# Patient Record
Sex: FEMALE | Race: WHITE | NOT HISPANIC OR LATINO | Employment: UNEMPLOYED | URBAN - METROPOLITAN AREA
[De-identification: names, ages, dates, MRNs, and addresses within clinical notes are randomized per-mention and may not be internally consistent; named-entity substitution may affect disease eponyms.]

---

## 2017-01-12 ENCOUNTER — TRANSCRIBE ORDERS (OUTPATIENT)
Dept: ADMINISTRATIVE | Facility: HOSPITAL | Age: 42
End: 2017-01-12

## 2017-01-12 ENCOUNTER — HOSPITAL ENCOUNTER (OUTPATIENT)
Dept: RADIOLOGY | Facility: HOSPITAL | Age: 42
Discharge: HOME | End: 2017-01-12
Payer: COMMERCIAL

## 2017-01-12 DIAGNOSIS — R52 PAIN: Primary | ICD-10-CM

## 2017-01-12 DIAGNOSIS — R52 PAIN: ICD-10-CM

## 2017-01-12 PROCEDURE — 73502 X-RAY EXAM HIP UNI 2-3 VIEWS: CPT

## 2017-04-04 ENCOUNTER — ALLSCRIPTS OFFICE VISIT (OUTPATIENT)
Dept: OTHER | Facility: OTHER | Age: 42
End: 2017-04-04

## 2017-04-04 DIAGNOSIS — M25.552 PAIN IN LEFT HIP: ICD-10-CM

## 2017-04-05 ENCOUNTER — GENERIC CONVERSION - ENCOUNTER (OUTPATIENT)
Dept: OTHER | Facility: OTHER | Age: 42
End: 2017-04-05

## 2017-04-05 ENCOUNTER — APPOINTMENT (OUTPATIENT)
Dept: PHYSICAL THERAPY | Facility: CLINIC | Age: 42
End: 2017-04-05
Payer: COMMERCIAL

## 2017-04-05 DIAGNOSIS — M25.552 PAIN IN LEFT HIP: ICD-10-CM

## 2017-04-05 PROCEDURE — 97140 MANUAL THERAPY 1/> REGIONS: CPT

## 2017-04-05 PROCEDURE — 97161 PT EVAL LOW COMPLEX 20 MIN: CPT

## 2017-04-11 ENCOUNTER — APPOINTMENT (OUTPATIENT)
Dept: PHYSICAL THERAPY | Facility: CLINIC | Age: 42
End: 2017-04-11
Payer: COMMERCIAL

## 2017-04-11 PROCEDURE — 97140 MANUAL THERAPY 1/> REGIONS: CPT

## 2017-04-11 PROCEDURE — 97110 THERAPEUTIC EXERCISES: CPT

## 2017-04-11 PROCEDURE — 97010 HOT OR COLD PACKS THERAPY: CPT

## 2017-04-18 ENCOUNTER — APPOINTMENT (OUTPATIENT)
Dept: PHYSICAL THERAPY | Facility: CLINIC | Age: 42
End: 2017-04-18
Payer: COMMERCIAL

## 2017-04-18 PROCEDURE — 97110 THERAPEUTIC EXERCISES: CPT

## 2017-04-18 PROCEDURE — 97140 MANUAL THERAPY 1/> REGIONS: CPT

## 2017-04-20 ENCOUNTER — APPOINTMENT (OUTPATIENT)
Dept: PHYSICAL THERAPY | Facility: CLINIC | Age: 42
End: 2017-04-20
Payer: COMMERCIAL

## 2017-04-20 PROCEDURE — 97140 MANUAL THERAPY 1/> REGIONS: CPT

## 2017-04-20 PROCEDURE — 97110 THERAPEUTIC EXERCISES: CPT

## 2017-04-25 ENCOUNTER — APPOINTMENT (OUTPATIENT)
Dept: PHYSICAL THERAPY | Facility: CLINIC | Age: 42
End: 2017-04-25
Payer: COMMERCIAL

## 2017-04-25 PROCEDURE — 97110 THERAPEUTIC EXERCISES: CPT

## 2017-04-25 PROCEDURE — 97140 MANUAL THERAPY 1/> REGIONS: CPT

## 2017-04-27 ENCOUNTER — APPOINTMENT (OUTPATIENT)
Dept: PHYSICAL THERAPY | Facility: CLINIC | Age: 42
End: 2017-04-27
Payer: COMMERCIAL

## 2017-04-27 PROCEDURE — 97014 ELECTRIC STIMULATION THERAPY: CPT

## 2017-04-27 PROCEDURE — G0283 ELEC STIM OTHER THAN WOUND: HCPCS

## 2017-04-27 PROCEDURE — 97140 MANUAL THERAPY 1/> REGIONS: CPT

## 2017-04-27 PROCEDURE — 97010 HOT OR COLD PACKS THERAPY: CPT

## 2017-04-27 PROCEDURE — 97110 THERAPEUTIC EXERCISES: CPT

## 2017-05-02 ENCOUNTER — APPOINTMENT (OUTPATIENT)
Dept: PHYSICAL THERAPY | Facility: CLINIC | Age: 42
End: 2017-05-02
Payer: COMMERCIAL

## 2017-05-02 PROCEDURE — 97140 MANUAL THERAPY 1/> REGIONS: CPT

## 2017-05-02 PROCEDURE — 97110 THERAPEUTIC EXERCISES: CPT

## 2017-05-04 ENCOUNTER — APPOINTMENT (OUTPATIENT)
Dept: PHYSICAL THERAPY | Facility: CLINIC | Age: 42
End: 2017-05-04
Payer: COMMERCIAL

## 2017-05-04 PROCEDURE — 97110 THERAPEUTIC EXERCISES: CPT

## 2017-05-04 PROCEDURE — 97140 MANUAL THERAPY 1/> REGIONS: CPT

## 2017-05-09 ENCOUNTER — APPOINTMENT (OUTPATIENT)
Dept: PHYSICAL THERAPY | Facility: CLINIC | Age: 42
End: 2017-05-09
Payer: COMMERCIAL

## 2017-05-09 PROCEDURE — 97140 MANUAL THERAPY 1/> REGIONS: CPT

## 2017-05-09 PROCEDURE — 97110 THERAPEUTIC EXERCISES: CPT

## 2017-05-11 ENCOUNTER — APPOINTMENT (OUTPATIENT)
Dept: PHYSICAL THERAPY | Facility: CLINIC | Age: 42
End: 2017-05-11
Payer: COMMERCIAL

## 2017-05-11 ENCOUNTER — GENERIC CONVERSION - ENCOUNTER (OUTPATIENT)
Dept: OTHER | Facility: OTHER | Age: 42
End: 2017-05-11

## 2017-05-11 PROCEDURE — 97110 THERAPEUTIC EXERCISES: CPT

## 2017-05-11 PROCEDURE — 97140 MANUAL THERAPY 1/> REGIONS: CPT

## 2017-05-16 ENCOUNTER — TRANSCRIBE ORDERS (OUTPATIENT)
Dept: ADMINISTRATIVE | Facility: HOSPITAL | Age: 42
End: 2017-05-16

## 2017-05-16 ENCOUNTER — ALLSCRIPTS OFFICE VISIT (OUTPATIENT)
Dept: OTHER | Facility: OTHER | Age: 42
End: 2017-05-16

## 2017-05-16 DIAGNOSIS — M25.552 LEFT HIP PAIN: Primary | ICD-10-CM

## 2017-05-24 ENCOUNTER — HOSPITAL ENCOUNTER (OUTPATIENT)
Dept: RADIOLOGY | Facility: HOSPITAL | Age: 42
Discharge: HOME | End: 2017-05-24
Attending: ORTHOPAEDIC SURGERY
Payer: COMMERCIAL

## 2017-05-24 ENCOUNTER — HOSPITAL ENCOUNTER (OUTPATIENT)
Dept: MRI IMAGING | Facility: HOSPITAL | Age: 42
Discharge: HOME | End: 2017-05-24
Attending: ORTHOPAEDIC SURGERY
Payer: COMMERCIAL

## 2017-05-24 DIAGNOSIS — M25.552 LEFT HIP PAIN: ICD-10-CM

## 2017-05-24 PROCEDURE — 73722 MRI JOINT OF LWR EXTR W/DYE: CPT

## 2017-05-24 PROCEDURE — A9585 GADOBUTROL INJECTION: HCPCS | Performed by: ORTHOPAEDIC SURGERY

## 2017-05-24 PROCEDURE — 77002 NEEDLE LOCALIZATION BY XRAY: CPT

## 2017-05-24 PROCEDURE — 27095 INJECTION FOR HIP X-RAY: CPT

## 2017-05-24 RX ADMIN — IOHEXOL 50 ML: 300 INJECTION, SOLUTION INTRAVENOUS at 13:11

## 2017-05-24 RX ADMIN — GADOBUTROL 0.2 ML: 604.72 INJECTION INTRAVENOUS at 13:12

## 2017-05-26 ENCOUNTER — ALLSCRIPTS OFFICE VISIT (OUTPATIENT)
Dept: OTHER | Facility: OTHER | Age: 42
End: 2017-05-26

## 2017-05-30 ENCOUNTER — GENERIC CONVERSION - ENCOUNTER (OUTPATIENT)
Dept: OTHER | Facility: OTHER | Age: 42
End: 2017-05-30

## 2017-08-14 ENCOUNTER — APPOINTMENT (OUTPATIENT)
Dept: PHYSICAL THERAPY | Facility: CLINIC | Age: 42
End: 2017-08-14
Payer: COMMERCIAL

## 2017-08-14 PROCEDURE — 97140 MANUAL THERAPY 1/> REGIONS: CPT

## 2017-08-14 PROCEDURE — 97010 HOT OR COLD PACKS THERAPY: CPT

## 2017-08-14 PROCEDURE — 97162 PT EVAL MOD COMPLEX 30 MIN: CPT

## 2017-08-17 ENCOUNTER — APPOINTMENT (OUTPATIENT)
Dept: PHYSICAL THERAPY | Facility: CLINIC | Age: 42
End: 2017-08-17
Payer: COMMERCIAL

## 2017-08-17 PROCEDURE — 97010 HOT OR COLD PACKS THERAPY: CPT

## 2017-08-17 PROCEDURE — 97140 MANUAL THERAPY 1/> REGIONS: CPT

## 2017-08-17 PROCEDURE — 97110 THERAPEUTIC EXERCISES: CPT

## 2017-08-22 ENCOUNTER — APPOINTMENT (OUTPATIENT)
Dept: PHYSICAL THERAPY | Facility: CLINIC | Age: 42
End: 2017-08-22
Payer: COMMERCIAL

## 2017-08-22 PROCEDURE — 97110 THERAPEUTIC EXERCISES: CPT

## 2017-08-22 PROCEDURE — 97140 MANUAL THERAPY 1/> REGIONS: CPT

## 2017-08-24 ENCOUNTER — APPOINTMENT (OUTPATIENT)
Dept: PHYSICAL THERAPY | Facility: CLINIC | Age: 42
End: 2017-08-24
Payer: COMMERCIAL

## 2017-08-24 PROCEDURE — 97010 HOT OR COLD PACKS THERAPY: CPT

## 2017-08-24 PROCEDURE — 97110 THERAPEUTIC EXERCISES: CPT

## 2017-08-24 PROCEDURE — 97140 MANUAL THERAPY 1/> REGIONS: CPT

## 2017-08-29 ENCOUNTER — APPOINTMENT (OUTPATIENT)
Dept: PHYSICAL THERAPY | Facility: CLINIC | Age: 42
End: 2017-08-29
Payer: COMMERCIAL

## 2017-08-29 PROCEDURE — 97010 HOT OR COLD PACKS THERAPY: CPT

## 2017-08-29 PROCEDURE — 97140 MANUAL THERAPY 1/> REGIONS: CPT

## 2017-08-29 PROCEDURE — 97110 THERAPEUTIC EXERCISES: CPT

## 2017-08-31 ENCOUNTER — APPOINTMENT (OUTPATIENT)
Dept: PHYSICAL THERAPY | Facility: CLINIC | Age: 42
End: 2017-08-31
Payer: COMMERCIAL

## 2017-08-31 PROCEDURE — 97140 MANUAL THERAPY 1/> REGIONS: CPT

## 2017-08-31 PROCEDURE — 97110 THERAPEUTIC EXERCISES: CPT

## 2017-08-31 PROCEDURE — 97010 HOT OR COLD PACKS THERAPY: CPT

## 2017-09-05 ENCOUNTER — APPOINTMENT (OUTPATIENT)
Dept: PHYSICAL THERAPY | Facility: CLINIC | Age: 42
End: 2017-09-05
Payer: COMMERCIAL

## 2017-09-05 PROCEDURE — 97110 THERAPEUTIC EXERCISES: CPT

## 2017-09-05 PROCEDURE — 97140 MANUAL THERAPY 1/> REGIONS: CPT

## 2017-09-07 ENCOUNTER — APPOINTMENT (OUTPATIENT)
Dept: PHYSICAL THERAPY | Facility: CLINIC | Age: 42
End: 2017-09-07
Payer: COMMERCIAL

## 2017-09-07 PROCEDURE — 97110 THERAPEUTIC EXERCISES: CPT

## 2017-09-07 PROCEDURE — 97140 MANUAL THERAPY 1/> REGIONS: CPT

## 2017-09-07 PROCEDURE — 97010 HOT OR COLD PACKS THERAPY: CPT

## 2017-09-12 ENCOUNTER — APPOINTMENT (OUTPATIENT)
Dept: PHYSICAL THERAPY | Facility: CLINIC | Age: 42
End: 2017-09-12
Payer: COMMERCIAL

## 2017-09-12 PROCEDURE — 97110 THERAPEUTIC EXERCISES: CPT

## 2017-09-12 PROCEDURE — 97010 HOT OR COLD PACKS THERAPY: CPT

## 2017-09-12 PROCEDURE — 97140 MANUAL THERAPY 1/> REGIONS: CPT

## 2017-09-14 ENCOUNTER — APPOINTMENT (OUTPATIENT)
Dept: PHYSICAL THERAPY | Facility: CLINIC | Age: 42
End: 2017-09-14
Payer: COMMERCIAL

## 2017-09-19 ENCOUNTER — APPOINTMENT (OUTPATIENT)
Dept: PHYSICAL THERAPY | Facility: CLINIC | Age: 42
End: 2017-09-19
Payer: COMMERCIAL

## 2017-09-19 PROCEDURE — 97010 HOT OR COLD PACKS THERAPY: CPT

## 2017-09-19 PROCEDURE — 97140 MANUAL THERAPY 1/> REGIONS: CPT

## 2017-09-19 PROCEDURE — 97110 THERAPEUTIC EXERCISES: CPT

## 2017-09-21 ENCOUNTER — APPOINTMENT (OUTPATIENT)
Dept: PHYSICAL THERAPY | Facility: CLINIC | Age: 42
End: 2017-09-21
Payer: COMMERCIAL

## 2017-09-21 PROCEDURE — 97110 THERAPEUTIC EXERCISES: CPT

## 2017-09-21 PROCEDURE — 97010 HOT OR COLD PACKS THERAPY: CPT

## 2017-09-21 PROCEDURE — 97140 MANUAL THERAPY 1/> REGIONS: CPT

## 2017-09-26 ENCOUNTER — APPOINTMENT (OUTPATIENT)
Dept: PHYSICAL THERAPY | Facility: CLINIC | Age: 42
End: 2017-09-26
Payer: COMMERCIAL

## 2017-09-26 PROCEDURE — 97010 HOT OR COLD PACKS THERAPY: CPT

## 2017-09-26 PROCEDURE — 97140 MANUAL THERAPY 1/> REGIONS: CPT

## 2017-09-26 PROCEDURE — 97110 THERAPEUTIC EXERCISES: CPT

## 2017-09-28 ENCOUNTER — APPOINTMENT (OUTPATIENT)
Dept: PHYSICAL THERAPY | Facility: CLINIC | Age: 42
End: 2017-09-28
Payer: COMMERCIAL

## 2017-09-28 PROCEDURE — 97110 THERAPEUTIC EXERCISES: CPT

## 2017-09-28 PROCEDURE — 97140 MANUAL THERAPY 1/> REGIONS: CPT

## 2017-10-03 ENCOUNTER — APPOINTMENT (OUTPATIENT)
Dept: PHYSICAL THERAPY | Facility: CLINIC | Age: 42
End: 2017-10-03
Payer: COMMERCIAL

## 2017-10-03 PROCEDURE — 97140 MANUAL THERAPY 1/> REGIONS: CPT

## 2017-10-03 PROCEDURE — 97110 THERAPEUTIC EXERCISES: CPT

## 2017-10-03 PROCEDURE — 97010 HOT OR COLD PACKS THERAPY: CPT

## 2017-10-06 ENCOUNTER — HOSPITAL ENCOUNTER (OUTPATIENT)
Dept: RADIOLOGY | Facility: HOSPITAL | Age: 42
Discharge: HOME | End: 2017-10-06
Payer: COMMERCIAL

## 2017-10-06 ENCOUNTER — TRANSCRIBE ORDERS (OUTPATIENT)
Dept: ADMINISTRATIVE | Facility: HOSPITAL | Age: 42
End: 2017-10-06

## 2017-10-06 DIAGNOSIS — G89.29 OTHER CHRONIC PAIN: ICD-10-CM

## 2017-10-06 DIAGNOSIS — G89.29 OTHER CHRONIC PAIN: Primary | ICD-10-CM

## 2017-10-06 PROCEDURE — 72050 X-RAY EXAM NECK SPINE 4/5VWS: CPT

## 2017-10-10 ENCOUNTER — APPOINTMENT (OUTPATIENT)
Dept: PHYSICAL THERAPY | Facility: CLINIC | Age: 42
End: 2017-10-10
Payer: COMMERCIAL

## 2017-10-10 PROCEDURE — 97110 THERAPEUTIC EXERCISES: CPT

## 2017-10-10 PROCEDURE — G8991 OTHER PT/OT GOAL STATUS: HCPCS

## 2017-10-10 PROCEDURE — G8990 OTHER PT/OT CURRENT STATUS: HCPCS

## 2017-10-10 PROCEDURE — 97140 MANUAL THERAPY 1/> REGIONS: CPT

## 2017-10-20 ENCOUNTER — APPOINTMENT (OUTPATIENT)
Dept: PHYSICAL THERAPY | Facility: CLINIC | Age: 42
End: 2017-10-20
Payer: COMMERCIAL

## 2017-10-20 PROCEDURE — 97161 PT EVAL LOW COMPLEX 20 MIN: CPT

## 2017-10-20 PROCEDURE — G8978 MOBILITY CURRENT STATUS: HCPCS

## 2017-10-20 PROCEDURE — G8979 MOBILITY GOAL STATUS: HCPCS

## 2017-10-20 PROCEDURE — 97140 MANUAL THERAPY 1/> REGIONS: CPT

## 2017-10-24 ENCOUNTER — APPOINTMENT (OUTPATIENT)
Dept: PHYSICAL THERAPY | Facility: CLINIC | Age: 42
End: 2017-10-24
Payer: COMMERCIAL

## 2017-10-27 ENCOUNTER — APPOINTMENT (OUTPATIENT)
Dept: PHYSICAL THERAPY | Facility: CLINIC | Age: 42
End: 2017-10-27
Payer: COMMERCIAL

## 2017-10-31 ENCOUNTER — APPOINTMENT (OUTPATIENT)
Dept: PHYSICAL THERAPY | Facility: CLINIC | Age: 42
End: 2017-10-31
Payer: COMMERCIAL

## 2017-10-31 PROCEDURE — 97140 MANUAL THERAPY 1/> REGIONS: CPT

## 2017-10-31 PROCEDURE — 97110 THERAPEUTIC EXERCISES: CPT

## 2017-10-31 PROCEDURE — 97112 NEUROMUSCULAR REEDUCATION: CPT

## 2017-11-03 ENCOUNTER — APPOINTMENT (OUTPATIENT)
Dept: PHYSICAL THERAPY | Facility: CLINIC | Age: 42
End: 2017-11-03
Payer: COMMERCIAL

## 2017-11-07 ENCOUNTER — APPOINTMENT (OUTPATIENT)
Dept: PHYSICAL THERAPY | Facility: CLINIC | Age: 42
End: 2017-11-07
Payer: COMMERCIAL

## 2017-11-10 ENCOUNTER — APPOINTMENT (OUTPATIENT)
Dept: PHYSICAL THERAPY | Facility: CLINIC | Age: 42
End: 2017-11-10
Payer: COMMERCIAL

## 2017-11-14 ENCOUNTER — APPOINTMENT (OUTPATIENT)
Dept: PHYSICAL THERAPY | Facility: CLINIC | Age: 42
End: 2017-11-14
Payer: COMMERCIAL

## 2017-11-14 PROCEDURE — 97140 MANUAL THERAPY 1/> REGIONS: CPT

## 2017-11-14 PROCEDURE — 97112 NEUROMUSCULAR REEDUCATION: CPT

## 2017-11-28 ENCOUNTER — APPOINTMENT (OUTPATIENT)
Dept: PHYSICAL THERAPY | Facility: CLINIC | Age: 42
End: 2017-11-28
Payer: COMMERCIAL

## 2017-11-28 PROCEDURE — G8979 MOBILITY GOAL STATUS: HCPCS

## 2017-11-28 PROCEDURE — G8980 MOBILITY D/C STATUS: HCPCS

## 2017-11-28 PROCEDURE — 97140 MANUAL THERAPY 1/> REGIONS: CPT

## 2017-12-28 ENCOUNTER — TRANSCRIBE ORDERS (OUTPATIENT)
Dept: ADMINISTRATIVE | Facility: HOSPITAL | Age: 42
End: 2017-12-28

## 2017-12-28 DIAGNOSIS — Z12.31 VISIT FOR SCREENING MAMMOGRAM: Primary | ICD-10-CM

## 2018-01-04 ENCOUNTER — HOSPITAL ENCOUNTER (OUTPATIENT)
Dept: BONE DENSITY | Facility: IMAGING CENTER | Age: 43
Discharge: HOME | End: 2018-01-04
Payer: COMMERCIAL

## 2018-01-04 ENCOUNTER — GENERIC CONVERSION - ENCOUNTER (OUTPATIENT)
Dept: OTHER | Facility: OTHER | Age: 43
End: 2018-01-04

## 2018-01-04 DIAGNOSIS — Z12.31 VISIT FOR SCREENING MAMMOGRAM: ICD-10-CM

## 2018-01-04 PROCEDURE — 77067 SCR MAMMO BI INCL CAD: CPT

## 2018-01-12 VITALS
WEIGHT: 178 LBS | SYSTOLIC BLOOD PRESSURE: 111 MMHG | BODY MASS INDEX: 28.61 KG/M2 | DIASTOLIC BLOOD PRESSURE: 71 MMHG | HEART RATE: 75 BPM | HEIGHT: 66 IN

## 2018-01-12 VITALS
WEIGHT: 180 LBS | HEART RATE: 75 BPM | HEIGHT: 66 IN | SYSTOLIC BLOOD PRESSURE: 122 MMHG | DIASTOLIC BLOOD PRESSURE: 78 MMHG | BODY MASS INDEX: 28.93 KG/M2

## 2018-01-13 VITALS
HEART RATE: 80 BPM | BODY MASS INDEX: 28.45 KG/M2 | WEIGHT: 177 LBS | DIASTOLIC BLOOD PRESSURE: 84 MMHG | SYSTOLIC BLOOD PRESSURE: 129 MMHG | HEIGHT: 66 IN

## 2018-09-04 ENCOUNTER — TRANSCRIBE ORDERS (OUTPATIENT)
Dept: PHYSICAL THERAPY | Facility: CLINIC | Age: 43
End: 2018-09-04

## 2018-09-04 ENCOUNTER — EVALUATION (OUTPATIENT)
Dept: PHYSICAL THERAPY | Facility: CLINIC | Age: 43
End: 2018-09-04
Payer: COMMERCIAL

## 2018-09-04 DIAGNOSIS — M25.552 BILATERAL HIP PAIN: Primary | ICD-10-CM

## 2018-09-04 DIAGNOSIS — M25.551 BILATERAL HIP PAIN: Primary | ICD-10-CM

## 2018-09-04 PROCEDURE — 97140 MANUAL THERAPY 1/> REGIONS: CPT

## 2018-09-04 PROCEDURE — G8979 MOBILITY GOAL STATUS: HCPCS

## 2018-09-04 PROCEDURE — 97161 PT EVAL LOW COMPLEX 20 MIN: CPT

## 2018-09-04 PROCEDURE — G8978 MOBILITY CURRENT STATUS: HCPCS

## 2018-09-04 NOTE — LETTER
2018    Kristian Conley MD  [address] [place] [id number]    Patient: Fredy Cadena   YOB: 1975   Date of Visit: 2018     Encounter Diagnosis     ICD-10-CM    1  Bilateral hip pain M25 551     M25 552        Dear Dr Huber Folds:    Please review the attached Plan of Care from [facility] recent visit  Please verify that you agree therapy should continue by signing the attached document and sending it back to our office  If you have any questions or concerns, please don't hesitate to call  Sincerely,    Josue Diaz PT      Referring Provider:      I certify that I have read the below Plan of Care and certify the need for these services furnished under this plan of treatment while under my care  Kristian Conley MD  [address] [place] [id number]  VIA Facsimile: [phone]          PT Evaluation     Today's date: 2018  Patient name: Fredy Cadena  : 1975  MRN: [id number]  Referring provider: Eileen Rebolledo MD  Dx:   Encounter Diagnosis     ICD-10-CM    1  Bilateral hip pain M25 551     M25 552                   Assessment  Impairments: abnormal or restricted ROM, activity intolerance, impaired physical strength and pain with function    Assessment details: Pt is a 39year old female who presents to PT with complaints of BL hip pain  She has a history of L hip surgery from a year ago where she had labral debridement  She reports her symptoms are similar to what they were prior to surgery  She presents with weakness on he L side, tenderness to L anterior hip and greater trochanter, mild LOM in hip rotation, decreased flexibility in her L hip flexor, and decreased functioning  She will benefit from skilled PT to help improve her mobility, strength, core stability, and overall functioning   Thank you kindly for your referral    Understanding of Dx/Px/POC: good   Prognosis: good    Goals  STG (3-4 weeks)  1: Decrease pain 2-3 grades on VAS  2: decreased pain with hip flexion  3: decreased hip flexor tightness by 25%    LTG (4-6 weeks)  1: Improve FOTO 10 pts  2: pt able to go up/down stairs with improved ease by 50%  3: LE strength 4+/5 throughout    Plan  Patient would benefit from: skilled physical therapy  Planned modality interventions: cryotherapy and ultrasound  Planned therapy interventions: manual therapy, joint mobilization, massage, abdominal trunk stabilization, neuromuscular re-education, strengthening, stretching, therapeutic activities, therapeutic exercise, home exercise program and flexibility  Frequency: 2x week  Duration in visits: 12  Duration in weeks: 6  Plan of Care beginning date: 2018  Plan of Care expiration date: 10/16/2018  Treatment plan discussed with: patient  Plan details: Initiate PT as per POC        Subjective Evaluation    History of Present Illness  Mechanism of injury: Symptoms started beg of July- wasn't recovering as well as before after activity- both hip started at same time  Had surgery on L hip last fall- 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, debrided labrum  Did x-ray on R- similar CAM lesion as L side  Symptoms started on R side  Difficulty going up/down stairs, reaching forward      Recurrent probem    Quality of life: good    Pain  Current pain ratin  At best pain ratin  At worst pain rating: 10  Location: L > R  Quality: radiating, sharp and dull ache  Relieving factors: ice and medications  Aggravating factors: stair climbing, walking and standing  Progression: worsening    Social Support  Steps to enter house: yes  2  Stairs in house: yes   Lives in: multiple-level home  Lives with: young children and spouse    Employment status: not working  Exercise history: ymca, yoga pilates, kickboxing      Diagnostic Tests  X-ray: abnormal (CAM on R; L no increased DJD)  Treatments  Previous treatment: physical therapy  Patient Goals  Patient goals for therapy: decreased pain, increased motion, independence with ADLs/IADLs, increased strength and return to sport/leisure activities  Patient goal: want to return to exercise, decrease pain        Objective     Observations     Additional Observation Details  Pelvis level  Tenderness to palpation to L hip flexor and greater trochanter      Active Range of Motion   Left Hip   Flexion: 115 degrees WFL and with pain  External rotation (90/90): 35 degrees   Internal rotation (90/90): 30 degrees     Right Hip   Flexion: 115 degrees   External rotation (90/90): 40 degrees   Internal rotation (90/90): 30 degrees     Passive Range of Motion   Left Hip   Extension: 0 degrees     Right Hip   Extension: 5 degrees     Additional Passive Range of Motion Details  -hamstring tightness  Mild hip flexor tightness on L  + Mona's on L      Strength/Myotome Testing     Left Hip   Planes of Motion   Flexion: 4  Extension: 4  Abduction: 4+  Adduction: 4+  External rotation: 4  Internal rotation: 4+ (+ pain)    Right Hip   Planes of Motion   Flexion: 4+  Extension: 4  Abduction: 4+  Adduction: 4+  External rotation: 4  Internal rotation: 4+    Left Knee   Flexion: 4  Extension: 4+    Right Knee   Flexion: 4+  Extension: 5    Tests     Left Hip   Positive VITOR, FADIR, Mona, scour and SI compression  Elbert: Positive  Right Hip   Positive FADIR  Negative VITOR and scour       Additional Tests Details  MET- L pelvis began long>>long- performed hip flexion in hooklying, R hip ext; pelvic alignment symmetrical after            Precautions: L hip labral debridement 7/28/17; R CAM deformity    Daily Treatment Diary     Manual              PROM BL hips             L hip flexor stretch             Lateral joint mobs             Gentle distraction                              Exercise Diary Modalities              MH BL hips in hookyling             US to anterior hip             CP post TE                 HEP:hip ER in hooklying, knee to chest, hip flex isometric, hip flexor stretch, PPT, hip add ball squeeze

## 2018-09-04 NOTE — PROGRESS NOTES
PT Evaluation     Today's date: 2018  Patient name: Rene Waddell  : 1975  MRN: [id number]  Referring provider: Hannah Au MD  Dx:   Encounter Diagnosis     ICD-10-CM    1  Bilateral hip pain M25 551     M25 552                   Assessment  Impairments: abnormal or restricted ROM, activity intolerance, impaired physical strength and pain with function    Assessment details: Pt is a 39year old female who presents to PT with complaints of BL hip pain  She has a history of L hip surgery from a year ago where she had labral debridement  She reports her symptoms are similar to what they were prior to surgery  She presents with weakness on he L side, tenderness to L anterior hip and greater trochanter, mild LOM in hip rotation, decreased flexibility in her L hip flexor, and decreased functioning  She will benefit from skilled PT to help improve her mobility, strength, core stability, and overall functioning   Thank you kindly for your referral    Understanding of Dx/Px/POC: good   Prognosis: good    Goals  STG (3-4 weeks)  1: Decrease pain 2-3 grades on VAS  2: decreased pain with hip flexion  3: decreased hip flexor tightness by 25%    LTG (4-6 weeks)  1: Improve FOTO 10 pts  2: pt able to go up/down stairs with improved ease by 50%  3: LE strength 4+/5 throughout    Plan  Patient would benefit from: skilled physical therapy  Planned modality interventions: cryotherapy and ultrasound  Planned therapy interventions: manual therapy, joint mobilization, massage, abdominal trunk stabilization, neuromuscular re-education, strengthening, stretching, therapeutic activities, therapeutic exercise, home exercise program and flexibility  Frequency: 2x week  Duration in visits: 12  Duration in weeks: 6  Plan of Care beginning date: 2018  Plan of Care expiration date: 10/16/2018  Treatment plan discussed with: patient  Plan details: Initiate PT as per POC        Subjective Evaluation    History of Present Illness  Mechanism of injury: Symptoms started - wasn't recovering as well as before after activity- both hip started at same time  Had surgery on L hip last fall- 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, debrided labrum  Did x-ray on R- similar CAM lesion as L side  Symptoms started on R side  Difficulty going up/down stairs, reaching forward      Recurrent probem    Quality of life: good    Pain  Current pain ratin  At best pain ratin  At worst pain rating: 10  Location: L > R  Quality: radiating, sharp and dull ache  Relieving factors: ice and medications  Aggravating factors: stair climbing, walking and standing  Progression: worsening    Social Support  Steps to enter house: yes  2  Stairs in house: yes   Lives in: multiple-level home  Lives with: young children and spouse    Employment status: not working  Exercise history: [employer], yoga pilates, kickboxing      Diagnostic Tests  X-ray: abnormal (CAM on R; L no increased DJD)  Treatments  Previous treatment: physical therapy  Patient Goals  Patient goals for therapy: decreased pain, increased motion, independence with ADLs/IADLs, increased strength and return to sport/leisure activities  Patient goal: want to return to exercise, decrease pain        Objective     Observations     Additional Observation Details  Pelvis level  Tenderness to palpation to L hip flexor and greater trochanter      Active Range of Motion   Left Hip   Flexion: 115 degrees WFL and with pain  External rotation (90/90): 35 degrees   Internal rotation (90/90): 30 degrees     Right Hip   Flexion: 115 degrees   External rotation (90/90): 40 degrees   Internal rotation (90/90): 30 degrees     Passive Range of Motion   Left Hip   Extension: 0 degrees     Right Hip   Extension: 5 degrees     Additional Passive Range of Motion Details  -hamstring tightness  Mild hip flexor tightness on L  + Mona's on L      Strength/Myotome Testing     Left Hip   Planes of Motion   Flexion: 4  Extension: 4  Abduction: 4+  Adduction: 4+  External rotation: 4  Internal rotation: 4+ (+ pain)    Right Hip   Planes of Motion   Flexion: 4+  Extension: 4  Abduction: 4+  Adduction: 4+  External rotation: 4  Internal rotation: 4+    Left Knee   Flexion: 4  Extension: 4+    Right Knee   Flexion: 4+  Extension: 5    Tests     Left Hip   Positive VITOR, FADIR, Mona, scour and SI compression  Elbert: Positive  Right Hip   Positive FADIR  Negative VITOR and scour       Additional Tests Details  MET- L pelvis began long>>long- performed hip flexion in hooklying, R hip ext; pelvic alignment symmetrical after            Precautions: L hip labral debridement 7/28/17; R CAM deformity    Daily Treatment Diary     Manual              PROM BL hips             L hip flexor stretch             Lateral joint mobs             Gentle distraction                              Exercise Diary                                                                                                                                                                                                                                                                                      Modalities              MH BL hips in hookyling             US to anterior hip             CP post TE                 HEP:hip ER in hooklying, knee to chest, hip flex isometric, hip flexor stretch, PPT, hip add ball squeeze

## 2018-09-07 ENCOUNTER — OFFICE VISIT (OUTPATIENT)
Dept: PHYSICAL THERAPY | Facility: CLINIC | Age: 43
End: 2018-09-07
Payer: COMMERCIAL

## 2018-09-07 DIAGNOSIS — M25.551 BILATERAL HIP PAIN: Primary | ICD-10-CM

## 2018-09-07 DIAGNOSIS — M25.552 BILATERAL HIP PAIN: Primary | ICD-10-CM

## 2018-09-07 PROCEDURE — 97035 APP MDLTY 1+ULTRASOUND EA 15: CPT

## 2018-09-07 PROCEDURE — 97140 MANUAL THERAPY 1/> REGIONS: CPT

## 2018-09-07 PROCEDURE — 97110 THERAPEUTIC EXERCISES: CPT

## 2018-09-07 NOTE — PROGRESS NOTES
Daily Note     Today's date: 2018  Patient name: Abida Pennington  : 1975  MRN: [id number]  Referring provider: Milton Acuna MD  Dx:   Encounter Diagnosis     ICD-10-CM    1  Bilateral hip pain M25 551     M25 552                   Subjective: Pt reports that her hips are sore today, L>R  She reports noticing fatigue after doing her HEP  Objective: See treatment diary below      Assessment: Tolerated treatment well  Reviewed HEP  Pt appears to have good adherence to her HEP  Focused on manual therapy and modalities this session  Pt reports "feeling a little better" after stretching on L          Plan: Continue with plan of care    Precautions: L hip labral debridement 17; R CAM deformity    Daily Treatment Diary     Manual              PROM BL hips TH            L hip flexor stretch TH            Lateral joint mobs             Gentle distraction TH             20                Exercise Diary              ADD ball squeeze 10"x10            Hip flexor isometric 5"x10                                                                                                                                                                                                                                                          Modalities              MH BL hips in hookyling 10'            US to anterior hip 8'            CP post TE

## 2018-09-11 ENCOUNTER — APPOINTMENT (OUTPATIENT)
Dept: PHYSICAL THERAPY | Facility: CLINIC | Age: 43
End: 2018-09-11
Payer: COMMERCIAL

## 2018-09-12 ENCOUNTER — APPOINTMENT (OUTPATIENT)
Dept: PHYSICAL THERAPY | Facility: CLINIC | Age: 43
End: 2018-09-12
Payer: COMMERCIAL

## 2018-09-14 ENCOUNTER — OFFICE VISIT (OUTPATIENT)
Dept: PHYSICAL THERAPY | Facility: CLINIC | Age: 43
End: 2018-09-14
Payer: COMMERCIAL

## 2018-09-14 DIAGNOSIS — M25.552 BILATERAL HIP PAIN: Primary | ICD-10-CM

## 2018-09-14 DIAGNOSIS — M25.551 BILATERAL HIP PAIN: Primary | ICD-10-CM

## 2018-09-14 PROCEDURE — 97110 THERAPEUTIC EXERCISES: CPT

## 2018-09-14 PROCEDURE — 97010 HOT OR COLD PACKS THERAPY: CPT

## 2018-09-14 PROCEDURE — 97140 MANUAL THERAPY 1/> REGIONS: CPT

## 2018-09-14 NOTE — PROGRESS NOTES
Daily Note     Today's date: 2018  Patient name: Denys Thomas  : 1975  MRN: [id number]  Referring provider: Janeth Medina MD  Dx:   Encounter Diagnosis     ICD-10-CM    1  Bilateral hip pain M25 551     M25 552                   Subjective: Pt feels the exercises are helping reduce radiating pain in both anterior hips but twisting motion still gives her joint pain  Today feels a little more sore due to bad weather  MH really helped last visit  Objective: See treatment diary below      Assessment: Tolerated treatment well  Patient exhibited good technique with therapeutic exercises and would benefit from continued PT  Pt reported feeling good after manuals with some soreness along superior L pelvis  Performed TPR to anterior hip flexor, piriformis, and QL, Pt reported minor relief after  Planning to ice at home  Plan: Continue per plan of care       Precautions: L hip labral debridement 17; R CAM deformity    Daily Treatment Diary     Manual             PROM BL hips TH 10           L hip flexor stretch TH 5           Lateral joint mobs  5           Gentle distraction TH 3            20 23               Exercise Diary             ADD ball squeeze 10"x10 10x10"           Hip flexor isometric 5"x10 5"x10           Hip abd isometric with strap  10x10"                                                                                                                                                                                                                             10               Modalities             MH BL hips in hookyling 10' 10'           US to anterior hip 8' NP           CP post TE               HEP:hip ER in hooklying, knee to chest, hip flex isometric, hip flexor stretch, PPT, hip add ball squeeze

## 2018-09-18 ENCOUNTER — OFFICE VISIT (OUTPATIENT)
Dept: PHYSICAL THERAPY | Facility: CLINIC | Age: 43
End: 2018-09-18
Payer: COMMERCIAL

## 2018-09-18 DIAGNOSIS — M25.551 BILATERAL HIP PAIN: Primary | ICD-10-CM

## 2018-09-18 DIAGNOSIS — M25.552 BILATERAL HIP PAIN: Primary | ICD-10-CM

## 2018-09-18 PROCEDURE — 97140 MANUAL THERAPY 1/> REGIONS: CPT

## 2018-09-18 PROCEDURE — 97010 HOT OR COLD PACKS THERAPY: CPT

## 2018-09-18 PROCEDURE — 97110 THERAPEUTIC EXERCISES: CPT

## 2018-09-18 NOTE — PROGRESS NOTES
Daily Note     Today's date: 2018  Patient name: Jono Montgomery  : 1975  MRN: [id number]  Referring provider: Justin Kim MD  Dx:   Encounter Diagnosis     ICD-10-CM    1  Bilateral hip pain M25 551     M25 552                   Subjective: Pt states over doing it on , walking around at [facility] with family  The following morning she had hip pain more L > R  She iced it, took advil, and did some stretches without feeling much relief  Post session pt states feeling better  Objective: See treatment diary below      Assessment: Performed below TE with good tolerance and technique  Performed TPR to hip flexor, with pain relief  Trial lateral distraction with band with good response  Dispensed and review self lateral mob, no questions or concerns  Concluded with CP to b/l hips in supine  Plan: Continue per plan of care       Precautions: L hip labral debridement 17; R CAM deformity    Daily Treatment Diary     Manual            PROM BL hips TH 10 8          L hip flexor stretch TH 5 5          Lateral joint mobs  5 5          Gentle distraction TH 3 4          TPR hip flexor   4          Quadped MWM  Lateral distraction with band   4           20 23 30              Exercise Diary            ADD ball squeeze 10"x10 10x10" 10"x10          Hip flexor isometric 5"x10 5"x10 5"x10          Hip abd isometric with strap  10x10" 10"x10                                                                                                                                                                                                                            10 10              Modalities            MH BL hips in hookyling 10' 10' NP          US to anterior hip 8' NP           CP post TE   10'            HEP:hip ER in hooklying, knee to chest, hip flex isometric, hip flexor stretch, PPT, hip add ball squeeze

## 2018-09-21 ENCOUNTER — OFFICE VISIT (OUTPATIENT)
Dept: PHYSICAL THERAPY | Facility: CLINIC | Age: 43
End: 2018-09-21
Payer: COMMERCIAL

## 2018-09-21 DIAGNOSIS — M25.551 BILATERAL HIP PAIN: Primary | ICD-10-CM

## 2018-09-21 DIAGNOSIS — M25.552 BILATERAL HIP PAIN: Primary | ICD-10-CM

## 2018-09-21 PROCEDURE — 97140 MANUAL THERAPY 1/> REGIONS: CPT

## 2018-09-21 PROCEDURE — 97110 THERAPEUTIC EXERCISES: CPT

## 2018-09-21 NOTE — PROGRESS NOTES
Daily Note     Today's date: 2018  Patient name: Betty Mcclure  : 1975  MRN: [id number]  Referring provider: Ang Mujica MD  Dx:   Encounter Diagnosis     ICD-10-CM    1  Bilateral hip pain M25 551     M25 552                   Subjective: Pt states still having pain in the joint; however, the radiating pain has subsided  States going to the MD on Oct 1st for a follow up  She hopes to get an MRI to see what exactly is going on  Post session pt states feel good in her muscle area but still feels stiff/pain in her R hip joint  Objective: See treatment diary below      Assessment: Performed below TE with good tolerance and technique  Noted tight L hip flexor during stretches, post pt states feeling good  Increased tissue tension in L hip flexor as well  Held MHP due to irritation in hips  Pt denied CP, pt states icing hips at home  Plan: Continue per plan of care       Precautions: L hip labral debridement 17; R CAM deformity    Daily Treatment Diary     Manual           PROM BL hips TH 10 8 8         L hip flexor stretch TH 5 5 5         Lateral joint mobs  5 5 5         Gentle distraction TH 3 4 4         TPR hip flexor   4 5         Quadped MWM  Lateral distraction with band   4           20 23 30 30             Exercise Diary           ADD ball squeeze 10"x10 10x10" 10"x10 10"x10         Hip flexor isometric 5"x10 5"x10 5"x10 5"x10         Hip abd isometric with strap  10x10" 10"x10 10"x10                                                                                                                                                                                                                           10 10              Modalities           MH BL hips in hookyling 10' 10' NP held         [address] to anterior hip 8' NP           CP post TE   10' 0           HEP:hip ER in hooklying, knee to chest, hip flex isometric, hip flexor stretch, PPT, hip add ball squeeze

## 2018-09-25 ENCOUNTER — OFFICE VISIT (OUTPATIENT)
Dept: PHYSICAL THERAPY | Facility: CLINIC | Age: 43
End: 2018-09-25
Payer: COMMERCIAL

## 2018-09-25 DIAGNOSIS — M25.551 BILATERAL HIP PAIN: Primary | ICD-10-CM

## 2018-09-25 DIAGNOSIS — M25.552 BILATERAL HIP PAIN: Primary | ICD-10-CM

## 2018-09-25 PROCEDURE — 97010 HOT OR COLD PACKS THERAPY: CPT

## 2018-09-25 PROCEDURE — 97140 MANUAL THERAPY 1/> REGIONS: CPT

## 2018-09-25 PROCEDURE — 97110 THERAPEUTIC EXERCISES: CPT

## 2018-09-25 NOTE — PROGRESS NOTES
Daily Note     Today's date: 2018  Patient name: Savi Higuera  : 1975  MRN: [id number]  Referring provider: Deven Garnica MD  Dx:   Encounter Diagnosis     ICD-10-CM    1  Bilateral hip pain M25 551     M25 552                   Subjective: Pt reports Monday morning having the worst pain in her L hip she ever felt  Pt states calling her MD that same day, they advised her to rest it and to come Oct 1st for a follow up  Post session pt states feeling less stiff and no pain; however, towards the end of the day pain flares up again  Objective: See treatment diary below      Assessment: Performed below TE with good tolerance and technique  Focused on manuals due to pain and stiffness in L hip  Pt experiences most relief after manuals and CP  Concluded session with CP  Pt last schedule appointment is   Plan: Continue per plan of care        Precautions: L hip labral debridement 17; R CAM deformity    Daily Treatment Diary     Manual          PROM BL hips TH 10 8 8 8        L hip flexor stretch TH 5 5 5 5        Lateral joint mobs  5 5 5 4        Gentle distraction TH 3 4 4 4        TPR hip flexor   4 5 4        Quadped MWM  Lateral distraction with band   4  `         20 23 30 30 25            Exercise Diary          ADD ball squeeze 10"x10 10x10" 10"x10 10"x10 10"x10        Hip flexor isometric 5"x10 5"x10 5"x10 5"x10 np        Hip abd isometric with strap  10x10" 10"x10 10"x10 10"x10                                                                                                                                                                                                                          10 10 10 8            Modalities          MH BL hips in hookyling 10' 10' NP held 10'        US to anterior hip 8' NP           CP post TE   10' 0 5(unbilled)              10          HEP:hip ER in hooklying, knee to chest, hip flex isometric, hip flexor stretch, PPT, hip add ball squeeze

## 2018-09-28 ENCOUNTER — OFFICE VISIT (OUTPATIENT)
Dept: PHYSICAL THERAPY | Facility: CLINIC | Age: 43
End: 2018-09-28
Payer: COMMERCIAL

## 2018-09-28 DIAGNOSIS — M25.552 BILATERAL HIP PAIN: Primary | ICD-10-CM

## 2018-09-28 DIAGNOSIS — M25.551 BILATERAL HIP PAIN: Primary | ICD-10-CM

## 2018-09-28 PROCEDURE — 97140 MANUAL THERAPY 1/> REGIONS: CPT

## 2018-09-28 PROCEDURE — 97010 HOT OR COLD PACKS THERAPY: CPT

## 2018-09-28 PROCEDURE — G8979 MOBILITY GOAL STATUS: HCPCS

## 2018-09-28 PROCEDURE — G8978 MOBILITY CURRENT STATUS: HCPCS

## 2018-09-28 NOTE — LETTER
2018    Amaya Andrews MD  [address] [facility] [address] [facility]    Patient: Graham Gan   YOB: 1975   Date of Visit: 2018     Encounter Diagnosis     ICD-10-CM    1  Bilateral hip pain M25 551     M25 552        Dear Dr Cotton Whitman:    Please review the attached Plan of Care from [facility] recent visit  Please verify that you agree therapy should continue by signing the attached document and sending it back to our office  If you have any questions or concerns, please don't hesitate to call  Sincerely,    Kian Dukes PT      Referring Provider:      I certify that I have read the below Plan of Care and certify the need for these services furnished under this plan of treatment while under my care  Amaya Andrews MD  [address] [facility] [address]  [address] [place] [address] Avenue: [phone]          PT Re-Evaluation     Today's date: 2018  Patient name: Graham Gan  : 1975  MRN: [id number]  Referring provider: Skylar Javier MD  Dx:   Encounter Diagnosis     ICD-10-CM    1  Bilateral hip pain M25 551     M25 552                   Assessment  Impairments: abnormal or restricted ROM, activity intolerance, impaired physical strength and pain with function    Assessment details: Pt has only improved slightly in her symptoms since starting therapy  She reports feeling better after therapy but pain sets in later on during the day  Pain is less radiating and more concentrated to anterior hip, L is worse than R  Her strength and ROM are the same despite performed manuals and strengthening exercises  Recommend placing therapy on hold at this time till further diagnostic testing is performed      Understanding of Dx/Px/POC: good   Prognosis: good    Goals  STG (3-4 weeks)  1: Decrease pain 2-3 grades on VAS- not met  2: decreased pain with hip flexion- not met  3: decreased hip flexor tightness by 25%- partially met    LTG (4-6 weeks)  1: Improve FOTO 10 pts- not met  2: pt able to go up/down stairs with improved ease by 50%- not met  3: LE strength 4+/5 throughout- not met    Plan  Patient would benefit from: skilled physical therapy  Planned modality interventions: cryotherapy and ultrasound  Planned therapy interventions: manual therapy, joint mobilization, massage, abdominal trunk stabilization, neuromuscular re-education, strengthening, stretching, therapeutic activities, therapeutic exercise, home exercise program and flexibility  Frequency: 1x week  Duration in visits: 3  Duration in weeks: 3  Plan of Care beginning date: 2018  Plan of Care expiration date: 10/12/2018  Treatment plan discussed with: patient  Plan details: Recommend placing therapy on hold, continue at your discretion        Subjective Evaluation    History of Present Illness  Mechanism of injury: Pt reports she is no longer experiencing radiating shooting pain down L hip  Pain is more sharp and concentrated to anterior hip   L is still more painful than R  Only minor relief with therapy        Recurrent probem    Quality of life: good    Pain  Current pain ratin  At best pain ratin  At worst pain rating: 10  Location: L > R  Quality: radiating, sharp and dull ache  Relieving factors: ice and medications  Aggravating factors: stair climbing, walking and standing  Progression: no change (only minor improvement)    Social Support  Steps to enter house: yes  2  Stairs in house: yes   Lives in: multiple-level home  Lives with: young children and spouse    Employment status: not working  Exercise history: [employer], yoga pilates, kickboxing      Diagnostic Tests  X-ray: abnormal (CAM on R; L no increased DJD)  Treatments  Previous treatment: physical therapy  Patient Goals  Patient goals for therapy: decreased pain, increased motion, independence with ADLs/IADLs, increased strength and return to sport/leisure activities  Patient goal: want to return to exercise, decrease pain        Objective     Observations     Additional Observation Details  Pelvis level  Tenderness to palpation to L hip flexor and greater trochanter      Active Range of Motion   Left Hip   Flexion: 115 degrees WFL and with pain  External rotation (90/90): 40 degrees   Internal rotation (90/90): 30 degrees     Right Hip   Flexion: 115 degrees   External rotation (90/90): 40 degrees   Internal rotation (90/90): 30 degrees     Passive Range of Motion   Left Hip   Extension: 5 degrees     Right Hip   Extension: 5 degrees     Additional Passive Range of Motion Details  -hamstring tightness  Mild hip flexor tightness on L  + Mona's on L      Strength/Myotome Testing     Left Hip   Planes of Motion   Flexion: 4 (pain limiting)  Extension: 4  Abduction: 4+  Adduction: 4+  External rotation: 4  Internal rotation: 4+ (+ pain)    Right Hip   Planes of Motion   Flexion: 4+  Extension: 4  Abduction: 4+  Adduction: 4+  External rotation: 4  Internal rotation: 4+    Left Knee   Flexion: 4  Extension: 4+    Right Knee   Flexion: 4+  Extension: 5    Tests     Left Hip   Positive VITOR, FADIR, Mona, scour and SI compression  Elbert: Positive  Right Hip   Positive FADIR  Negative VITOR and scour  Additional Tests Details              Precautions: L hip labral debridement 7/28/17; R CAM deformity    Daily Treatment Diary     Manual  9/7 9/14 9/18 9/21 9/25 9/28       PROM BL hips TH 10 8 8 8 5       L hip flexor stretch TH 5 5 5 5 5       Lateral joint mobs  5 5 5 4 4       Gentle distraction TH 3 4 4 4 4       TPR hip flexor   4 5 4 4       Quadped MWM  Lateral distraction with band   4  `         20 23 30 30 25 22           Exercise Diary  9/7 9/14 9/18 9/21 9/25 9/28       ADD ball squeeze 10"x10 10x10" 10"x10 10"x10 10"x10 Pain!        Hip flexor isometric 5"x10 5"x10 5"x10 5"x10 np np       Hip abd isometric with strap  10x10" 10"x10 10"x10 10"x10 10"X10 10 10 10 8 3           Modalities         MH BL hips in hookyling 10' 10' NP held 10' 10'       US to anterior hip 8' NP           CP post TE   10' 0 5(unbilled) 8             10 18         HEP:hip ER in hooklying, knee to chest, hip flex isometric, hip flexor stretch, PPT, hip add ball squeeze    PT Re-Evaluation     Today's date: 2018  Patient name: Papi Winston  : 1975  MRN: [id number]  Referring provider: Diego Samuels MD  Dx:   Encounter Diagnosis     ICD-10-CM    1  Bilateral hip pain M25 551     M25 552                   Assessment  Impairments: abnormal or restricted ROM, activity intolerance, impaired physical strength and pain with function    Assessment details: Pt has only improved slightly in her symptoms since starting therapy  She reports feeling better after therapy but pain sets in later on during the day  Pain is less radiating and more concentrated to anterior hip, L is worse than R  Her strength and ROM are the same despite performed manuals and strengthening exercises  Recommend placing therapy on hold at this time till further diagnostic testing is performed      Understanding of Dx/Px/POC: good   Prognosis: good    Goals  STG (3-4 weeks)  1: Decrease pain 2-3 grades on VAS- not met  2: decreased pain with hip flexion- not met  3: decreased hip flexor tightness by 25%- partially met    LTG (4-6 weeks)  1: Improve FOTO 10 pts- not met  2: pt able to go up/down stairs with improved ease by 50%- not met  3: LE strength 4+/5 throughout- not met    Plan  Patient would benefit from: skilled physical therapy  Planned modality interventions: cryotherapy and ultrasound  Planned therapy interventions: manual therapy, joint mobilization, massage, abdominal trunk stabilization, neuromuscular re-education, strengthening, stretching, therapeutic activities, therapeutic exercise, home exercise program and flexibility  Frequency: 1x week  Duration in visits: 3  Duration in weeks: 3  Plan of Care beginning date: 2018  Plan of Care expiration date: 10/12/2018  Treatment plan discussed with: patient  Plan details: Recommend placing therapy on hold, continue at your discretion        Subjective Evaluation    History of Present Illness  Mechanism of injury: Pt reports she is no longer experiencing radiating shooting pain down L hip  Pain is more sharp and concentrated to anterior hip   L is still more painful than R  Only minor relief with therapy        Recurrent probem    Quality of life: good    Pain  Current pain ratin  At best pain ratin  At worst pain rating: 10  Location: L > R  Quality: radiating, sharp and dull ache  Relieving factors: ice and medications  Aggravating factors: stair climbing, walking and standing  Progression: no change (only minor improvement)    Social Support  Steps to enter house: yes  2  Stairs in house: yes   Lives in: multiple-level home  Lives with: young children and spouse    Employment status: not working  Exercise history: [employer], yoga pilates, kickboxing      Diagnostic Tests  X-ray: abnormal (CAM on R; L no increased DJD)  Treatments  Previous treatment: physical therapy  Patient Goals  Patient goals for therapy: decreased pain, increased motion, independence with ADLs/IADLs, increased strength and return to sport/leisure activities  Patient goal: want to return to exercise, decrease pain        Objective     Observations     Additional Observation Details  Pelvis level  Tenderness to palpation to L hip flexor and greater trochanter      Active Range of Motion   Left Hip   Flexion: 115 degrees WFL and with pain  External rotation (90/90): 40 degrees   Internal rotation (90/90): 30 degrees     Right Hip   Flexion: 115 degrees   External rotation (90/90): 40 degrees   Internal rotation (90/90): 30 degrees     Passive Range of Motion   Left Hip   Extension: 5 degrees     Right Hip   Extension: 5 degrees     Additional Passive Range of Motion Details  -hamstring tightness  Mild hip flexor tightness on L  + Mona's on L      Strength/Myotome Testing     Left Hip   Planes of Motion   Flexion: 4 (pain limiting)  Extension: 4  Abduction: 4+  Adduction: 4+  External rotation: 4  Internal rotation: 4+ (+ pain)    Right Hip   Planes of Motion   Flexion: 4+  Extension: 4  Abduction: 4+  Adduction: 4+  External rotation: 4  Internal rotation: 4+    Left Knee   Flexion: 4  Extension: 4+    Right Knee   Flexion: 4+  Extension: 5    Tests     Left Hip   Positive VITOR, FADIR, Mona, scour and SI compression  Elbert: Positive  Right Hip   Positive FADIR  Negative VITOR and scour  Additional Tests Details          Flowsheet Rows      Most Recent Value   PT/OT G-Codes   Current Score  32   Projected Score  58   Assessment Type  Re-evaluation   G code set  Mobility: Walking & Moving Around   Mobility: Walking and Moving Around Current Status ()  CL   Mobility: Walking and Moving Around Goal Status ()  CK          Precautions: L hip labral debridement 7/28/17; R CAM deformity    Daily Treatment Diary     Manual  9/7 9/14 9/18 9/21 9/25 9/28       PROM BL hips TH 10 8 8 8 5       L hip flexor stretch TH 5 5 5 5 5       Lateral joint mobs  5 5 5 4 4       Gentle distraction TH 3 4 4 4 4       TPR hip flexor   4 5 4 4       Quadped MWM  Lateral distraction with band   4  `         20 23 30 30 25 22           Exercise Diary  9/7 9/14 9/18 9/21 9/25 9/28       ADD ball squeeze 10"x10 10x10" 10"x10 10"x10 10"x10 Pain!        Hip flexor isometric 5"x10 5"x10 5"x10 5"x10 np np       Hip abd isometric with strap  10x10" 10"x10 10"x10 10"x10 10"X10 10 10 10 8 3           Modalities  9/7 9/14 9/18 9/21 9/25 9/28       MH BL hips in hookyling 10' 10' NP held 10' 10'       US to anterior hip 8' NP           CP post TE   10' 0 5(unbilled) 8             10 18         HEP:hip ER in hooklying, knee to chest, hip flex isometric, hip flexor stretch, PPT, hip add ball squeeze

## 2018-09-28 NOTE — PROGRESS NOTES
PT Re-Evaluation     Today's date: 2018  Patient name: Tone Hurst  : 1975  MRN: [id number]  Referring provider: Dominic Cobb MD  Dx:   Encounter Diagnosis     ICD-10-CM    1  Bilateral hip pain M25 551     M25 552                   Assessment  Impairments: abnormal or restricted ROM, activity intolerance, impaired physical strength and pain with function    Assessment details: Pt has only improved slightly in her symptoms since starting therapy  She reports feeling better after therapy but pain sets in later on during the day  Pain is less radiating and more concentrated to anterior hip, L is worse than R  Her strength and ROM are the same despite performed manuals and strengthening exercises  Recommend placing therapy on hold at this time till further diagnostic testing is performed      Understanding of Dx/Px/POC: good   Prognosis: good    Goals  STG (3-4 weeks)  1: Decrease pain 2-3 grades on VAS- not met  2: decreased pain with hip flexion- not met  3: decreased hip flexor tightness by 25%- partially met    LTG (4-6 weeks)  1: Improve FOTO 10 pts- not met  2: pt able to go up/down stairs with improved ease by 50%- not met  3: LE strength 4+/5 throughout- not met    Plan  Patient would benefit from: skilled physical therapy  Planned modality interventions: cryotherapy and ultrasound  Planned therapy interventions: manual therapy, joint mobilization, massage, abdominal trunk stabilization, neuromuscular re-education, strengthening, stretching, therapeutic activities, therapeutic exercise, home exercise program and flexibility  Frequency: 1x week  Duration in visits: 3  Duration in weeks: 3  Plan of Care beginning date: 2018  Plan of Care expiration date: 10/12/2018  Treatment plan discussed with: patient  Plan details: Recommend placing therapy on hold, continue at your discretion        Subjective Evaluation    History of Present Illness  Mechanism of injury: Pt reports she is no longer experiencing radiating shooting pain down L hip  Pain is more sharp and concentrated to anterior hip   L is still more painful than R  Only minor relief with therapy        Recurrent probem    Quality of life: good    Pain  Current pain ratin  At best pain ratin  At worst pain rating: 10  Location: L > R  Quality: radiating, sharp and dull ache  Relieving factors: ice and medications  Aggravating factors: stair climbing, walking and standing  Progression: no change (only minor improvement)    Social Support  Steps to enter house: yes  2  Stairs in house: yes   Lives in: multiple-level home  Lives with: young children and spouse    Employment status: not working  Exercise history: [employer], yoga pilates, kickboxing      Diagnostic Tests  X-ray: abnormal (CAM on R; L no increased DJD)  Treatments  Previous treatment: physical therapy  Patient Goals  Patient goals for therapy: decreased pain, increased motion, independence with ADLs/IADLs, increased strength and return to sport/leisure activities  Patient goal: want to return to exercise, decrease pain        Objective     Observations     Additional Observation Details  Pelvis level  Tenderness to palpation to L hip flexor and greater trochanter      Active Range of Motion   Left Hip   Flexion: 115 degrees WFL and with pain  External rotation (90/90): 40 degrees   Internal rotation (90/90): 30 degrees     Right Hip   Flexion: 115 degrees   External rotation (90/90): 40 degrees   Internal rotation (90/90): 30 degrees     Passive Range of Motion   Left Hip   Extension: 5 degrees     Right Hip   Extension: 5 degrees     Additional Passive Range of Motion Details  -hamstring tightness  Mild hip flexor tightness on L  + Mona's on L      Strength/Myotome Testing     Left Hip   Planes of Motion   Flexion: 4 (pain limiting)  Extension: 4  Abduction: 4+  Adduction: 4+  External rotation: 4  Internal rotation: 4+ (+ pain)    Right Hip   Planes of Motion   Flexion: 4+  Extension: 4  Abduction: 4+  Adduction: 4+  External rotation: 4  Internal rotation: 4+    Left Knee   Flexion: 4  Extension: 4+    Right Knee   Flexion: 4+  Extension: 5    Tests     Left Hip   Positive VITOR, FADIR, Mona, scour and SI compression  Elbert: Positive  Right Hip   Positive FADIR  Negative VITOR and scour  Additional Tests Details          Flowsheet Rows      Most Recent Value   PT/OT G-Codes   Current Score  32   Projected Score  58   Assessment Type  Re-evaluation   G code set  Mobility: Walking & Moving Around   Mobility: Walking and Moving Around Current Status ()  CL   Mobility: Walking and Moving Around Goal Status ()  CK          Precautions: L hip labral debridement 7/28/17; R CAM deformity    Daily Treatment Diary     Manual  9/7 9/14 9/18 9/21 9/25 9/28       PROM BL hips TH 10 8 8 8 5       L hip flexor stretch TH 5 5 5 5 5       Lateral joint mobs  5 5 5 4 4       Gentle distraction TH 3 4 4 4 4       TPR hip flexor   4 5 4 4       Quadped MWM  Lateral distraction with band   4  `         20 23 30 30 25 22           Exercise Diary  9/7 9/14 9/18 9/21 9/25 9/28       ADD ball squeeze 10"x10 10x10" 10"x10 10"x10 10"x10 Pain!        Hip flexor isometric 5"x10 5"x10 5"x10 5"x10 np np       Hip abd isometric with strap  10x10" 10"x10 10"x10 10"x10 10"X10                                                                                                                                                                                                                         10 10 10 8 3           Modalities  9/7 9/14 9/18 9/21 9/25 9/28       MH BL hips in hookyling 10' 10' NP held 10' 10'       US to anterior hip 8' NP           CP post TE   10' 0 5(unbilled) 8             10 18         HEP:hip ER in hooklying, knee to chest, hip flex isometric, hip flexor stretch, PPT, hip add ball squeeze

## 2018-12-13 ENCOUNTER — EVALUATION (OUTPATIENT)
Dept: PHYSICAL THERAPY | Facility: CLINIC | Age: 43
End: 2018-12-13
Payer: COMMERCIAL

## 2018-12-13 DIAGNOSIS — M16.12 PRIMARY OSTEOARTHRITIS OF LEFT HIP: Primary | ICD-10-CM

## 2018-12-13 PROCEDURE — 97162 PT EVAL MOD COMPLEX 30 MIN: CPT | Performed by: PHYSICAL THERAPIST

## 2018-12-13 PROCEDURE — G8978 MOBILITY CURRENT STATUS: HCPCS | Performed by: PHYSICAL THERAPIST

## 2018-12-13 PROCEDURE — G8979 MOBILITY GOAL STATUS: HCPCS | Performed by: PHYSICAL THERAPIST

## 2018-12-13 PROCEDURE — 97110 THERAPEUTIC EXERCISES: CPT | Performed by: PHYSICAL THERAPIST

## 2018-12-13 RX ORDER — NAPROXEN 250 MG/1
250 TABLET ORAL 2 TIMES DAILY WITH MEALS
COMMUNITY
End: 2021-03-30

## 2018-12-13 NOTE — LETTER
2018    Tonia Mcdonald MD  [address] [place] [address] [facility]    Patient: Cristofer Lerma   YOB: 1975   Date of Visit: 2018     Encounter Diagnosis     ICD-10-CM    1  Primary osteoarthritis of left hip M16 12        Dear Dr Ce Raymond:    Please review the attached Plan of Care from [facility] recent visit  Please verify that you agree therapy should continue by signing the attached document and sending it back to our office  If you have any questions or concerns, please don't hesitate to call  Sincerely,    Marta Oro, PT      Referring Provider:      I certify that I have read the below Plan of Care and certify the need for these services furnished under this plan of treatment while under my care  Tonia Mcdonald MD  [address] [place] [address]  VIA Facsimile: [phone]          PT Evaluation     Today's date: 2018  Patient name: Cristofer Lerma  : 1975  MRN: [id number]  Referring provider: Maricarmen Coelho MD  Dx:   Encounter Diagnosis     ICD-10-CM    1  Primary osteoarthritis of left hip M16 12                   Assessment  Assessment details: Patient is a 39 y o  female presenting to outpatient PT with complaints of (L) hip pain  Patient describes chronic hip pain for several years with treatments including chiropractic care, PT, and orthopedic surgery  Patient displays with abnormal gait, (L) hip ROM restrictions, (L) hip weakness, (+) scour test, (+) VITOR test, (-) lumbar screen, (-) long sit test, and functional restrictions with pathanatomical signs and symptoms consistent with (L) Hip OA  Skilled PT is required to decrease pain and improve strength and ROM to allow patient to return to desired level of function with all activities    Thank you very much for your referral    Impairments: abnormal gait, abnormal muscle tone, abnormal or restricted ROM, abnormal movement, activity intolerance, impaired balance, impaired physical strength, lacks appropriate home exercise program, pain with function and poor body mechanics  Understanding of Dx/Px/POC: good   Prognosis: fair    Goals  ST  Decrease pain to 5/10 max in 2 weeks  2  Increase (L) hip AROM: IR to 25 degrees in 2 weeks  3  Increase (L) hip and knee strength by 1/2 MMT grade in 2 weeks  4  I with HEP in 2 weeks  5  Improve FOTO score to 46 in 2 weeks  LT  Decrease pain to 3/10 max in 4 weeks  2  Increase (L) hip AROM: IR to 30 degrees in 4 weeks  3  Increase (L) hip and knee strength to 4+/5 all planes in 4 weeks  4  I with HEP in 4 weeks  5  Improve FOTO score to 56 in 4 weeks  Plan  Patient would benefit from: skilled physical therapy  Planned modality interventions: cryotherapy and thermotherapy: hydrocollator packs  Planned therapy interventions: activity modification, joint mobilization, manual therapy, balance, neuromuscular re-education, body mechanics training, patient education, postural training, strengthening, stretching, therapeutic activities, therapeutic exercise, functional ROM exercises, home exercise program and gait training  Frequency: 2x week  Duration in visits: 8  Duration in weeks: 4  Plan of Care beginning date: 2018  Plan of Care expiration date: 1/10/2019  Treatment plan discussed with: patient        Subjective Evaluation    History of Present Illness  Mechanism of injury: Patient describes chronic hip pain for about 3 years  She describes insidious onset of pain with gradual progression of pain  She reports seeing her chiropractor - decided to get checked out by an orthopedic specialist - x-rays were performed - referred to PT  Patient was referred back to orthopedic specialist - MRI was ordered which showed a labral tear  Patient had (L) hip arthroscopy in 2017  She underwent PT - positive improvements    In the summer of  she had insidious increase in pain - had cortisone shot which did not significantly improve her pain  She returned to her orthopedic specialist - TETE was discussed and PT was recommended to manage her symptoms currently  Quality of life: good    Pain  Current pain ratin  At best pain ratin  At worst pain ratin  Location: (L) Hip   Quality: burning, dull ache and sharp    Social Support  Steps to enter house: yes  Stairs in house: yes   Lives in: multiple-level home  Lives with: spouse    Employment status: not working    Diagnostic Tests  X-ray: abnormal  MRI studies: abnormal  Treatments  Previous treatment: chiropractic, physical therapy, medication and injection treatment  Patient Goals  Patient goals for therapy: decreased pain, increased motion, increased strength, return to sport/leisure activities and improved balance  Patient goal: Return to active lifestyle - manage symptoms - prolong surgery        Objective     Static Posture   General Observations  Symmetrical weight bearing  Lumbar Spine   Decreased lordosis       Palpation     Additional Palpation Details  TTP (L) Hip anterior and proximal to greater trochanter - no tenderness at greater trochanter    Lumbar Screen  Lumbar range of motion within normal limits with the following exceptions:NE with repeated movement exam    Active Range of Motion   Left Hip   Flexion: 120 degrees   Abduction: 32 degrees   External rotation (90/90): 35 degrees   Internal rotation (90/90): 20 degrees     Right Hip   External rotation (90/90): 40 degrees   Internal rotation (90/90): 30 degrees     Strength/Myotome Testing     Left Hip   Planes of Motion   Flexion: 4+  Extension: 3+  Abduction: 4  External rotation: 4  Internal rotation: 4-    Right Hip   Planes of Motion   Flexion: 4+  Extension: 4  Abduction: 4  External rotation: 4+  Internal rotation: 4+    Left Knee   Flexion: 4  Extension: 4    Right Knee   Flexion: 4+  Extension: 4+    Left Ankle/Foot   Dorsiflexion: 4+  Plantar flexion: 4+    Right Ankle/Foot   Dorsiflexion: 4+  Plantar flexion: 4+    Tests     Left Hip   Positive VITOR and scour  Negative long sit  Right Hip   Negative long sit  Additional Tests Details  90/90 HS Lag: (B) 0 degrees     Ambulation     Observational Gait   Decreased walking speed and stride length     Left foot contact pattern: heel to toe  Right foot contact pattern: heel to toe  Left arm swing: within functional limits  Right arm swing: within functional limits  Base of support: normal       Daily Treatment Diary     DX: (L) Hip OA  EPOC: 1/10/19  Precautions: standard  CO-MORBIDITES: (L) Hip labral tear  PERSONAL FACTORS: N/A    Manual           (L) Hip Mobs          (L) Hip PROM                                            Exercise Diary           Elliptical                     SL Balance          Std Hip Flex          Std Hip ABD          Std Hip Ext          HS Curls          Mini Squats          FWD Mini Lunge          LAT Mini Lunge          FWD Step Up          LAT Step Up                    SLR          S/L Hip ABD          PHE          Bridge with [place] Hip ABD                    TB Hip IR          TB Hip ER                        Modalities

## 2018-12-13 NOTE — PROGRESS NOTES
PT Evaluation     Today's date: 2018  Patient name: Murali Arshad  : 1975  MRN: [id number]  Referring provider: Deliliah Fothergill, MD  Dx:   Encounter Diagnosis     ICD-10-CM    1  Primary osteoarthritis of left hip M16 12                   Assessment  Assessment details: Patient is a 39 y o  female presenting to outpatient PT with complaints of (L) hip pain  Patient describes chronic hip pain for several years with treatments including chiropractic care, PT, and orthopedic surgery  Patient displays with abnormal gait, (L) hip ROM restrictions, (L) hip weakness, (+) scour test, (+) VITOR test, (-) lumbar screen, (-) long sit test, and functional restrictions with pathanatomical signs and symptoms consistent with (L) Hip OA  Skilled PT is required to decrease pain and improve strength and ROM to allow patient to return to desired level of function with all activities  Thank you very much for your referral    Impairments: abnormal gait, abnormal muscle tone, abnormal or restricted ROM, abnormal movement, activity intolerance, impaired balance, impaired physical strength, lacks appropriate home exercise program, pain with function and poor body mechanics  Understanding of Dx/Px/POC: good   Prognosis: fair    Goals  ST  Decrease pain to 5/10 max in 2 weeks  2  Increase (L) hip AROM: IR to 25 degrees in 2 weeks  3  Increase (L) hip and knee strength by 1/2 MMT grade in 2 weeks  4  I with HEP in 2 weeks  5  Improve FOTO score to 46 in 2 weeks  LT  Decrease pain to 3/10 max in 4 weeks  2  Increase (L) hip AROM: IR to 30 degrees in 4 weeks  3  Increase (L) hip and knee strength to 4+/5 all planes in 4 weeks  4  I with HEP in 4 weeks  5  Improve FOTO score to 56 in 4 weeks        Plan  Patient would benefit from: skilled physical therapy  Planned modality interventions: cryotherapy and thermotherapy: hydrocollator packs  Planned therapy interventions: activity modification, joint mobilization, manual therapy, balance, neuromuscular re-education, body mechanics training, patient education, postural training, strengthening, stretching, therapeutic activities, therapeutic exercise, functional ROM exercises, home exercise program and gait training  Frequency: 2x week  Duration in visits: 8  Duration in weeks: 4  Plan of Care beginning date: 2018  Plan of Care expiration date: 1/10/2019  Treatment plan discussed with: patient        Subjective Evaluation    History of Present Illness  Mechanism of injury: Patient describes chronic hip pain for about 3 years  She describes insidious onset of pain with gradual progression of pain  She reports seeing her chiropractor - decided to get checked out by an orthopedic specialist - x-rays were performed - referred to PT  Patient was referred back to orthopedic specialist - MRI was ordered which showed a labral tear  Patient had (L) hip arthroscopy in 2017  She underwent PT - positive improvements  In the summer of  she had insidious increase in pain - had cortisone shot which did not significantly improve her pain  She returned to her orthopedic specialist - TETE was discussed and PT was recommended to manage her symptoms currently      Quality of life: good    Pain  Current pain ratin  At best pain ratin  At worst pain ratin  Location: (L) Hip   Quality: burning, dull ache and sharp    Social Support  Steps to enter house: yes  Stairs in house: yes   Lives in: multiple-level home  Lives with: spouse    Employment status: not working    Diagnostic Tests  X-ray: abnormal  MRI studies: abnormal  Treatments  Previous treatment: chiropractic, physical therapy, medication and injection treatment  Patient Goals  Patient goals for therapy: decreased pain, increased motion, increased strength, return to sport/leisure activities and improved balance  Patient goal: Return to active lifestyle - manage symptoms - prolong surgery        Objective     Static Posture   General Observations  Symmetrical weight bearing  Lumbar Spine   Decreased lordosis  Palpation     Additional Palpation Details  TTP (L) Hip anterior and proximal to greater trochanter - no tenderness at greater trochanter    Lumbar Screen  Lumbar range of motion within normal limits with the following exceptions:NE with repeated movement exam    Active Range of Motion   Left Hip   Flexion: 120 degrees   Abduction: 32 degrees   External rotation (90/90): 35 degrees   Internal rotation (90/90): 20 degrees     Right Hip   External rotation (90/90): 40 degrees   Internal rotation (90/90): 30 degrees     Strength/Myotome Testing     Left Hip   Planes of Motion   Flexion: 4+  Extension: 3+  Abduction: 4  External rotation: 4  Internal rotation: 4-    Right Hip   Planes of Motion   Flexion: 4+  Extension: 4  Abduction: 4  External rotation: 4+  Internal rotation: 4+    Left Knee   Flexion: 4  Extension: 4    Right Knee   Flexion: 4+  Extension: 4+    Left Ankle/Foot   Dorsiflexion: 4+  Plantar flexion: 4+    Right Ankle/Foot   Dorsiflexion: 4+  Plantar flexion: 4+    Tests     Left Hip   Positive VITOR and scour  Negative long sit  Right Hip   Negative long sit  Additional Tests Details  90/90 HS Lag: (B) 0 degrees     Ambulation     Observational Gait   Decreased walking speed and stride length     Left foot contact pattern: heel to toe  Right foot contact pattern: heel to toe  Left arm swing: within functional limits  Right arm swing: within functional limits  Base of support: normal       Daily Treatment Diary     DX: (L) Hip OA  EPOC: 1/10/19  Precautions: standard  CO-MORBIDITES: (L) Hip labral tear  PERSONAL FACTORS: N/A    Manual           (L) Hip Mobs          (L) Hip PROM                                            Exercise Diary           Elliptical                     SL Balance          Std Hip Flex          Std Hip ABD          Std Hip Ext HS Curls          Mini Squats          FWD Mini Lunge          LAT Mini Lunge          FWD Step Up          LAT Step Up                    SLR          S/L Hip ABD          PHE          Bridge with [place] Hip ABD                    TB Hip IR          TB Hip ER                        Modalities

## 2018-12-18 ENCOUNTER — OFFICE VISIT (OUTPATIENT)
Dept: PHYSICAL THERAPY | Facility: CLINIC | Age: 43
End: 2018-12-18
Payer: COMMERCIAL

## 2018-12-18 DIAGNOSIS — M16.12 PRIMARY OSTEOARTHRITIS OF LEFT HIP: Primary | ICD-10-CM

## 2018-12-18 PROCEDURE — 97112 NEUROMUSCULAR REEDUCATION: CPT | Performed by: PHYSICAL THERAPIST

## 2018-12-18 PROCEDURE — 97140 MANUAL THERAPY 1/> REGIONS: CPT | Performed by: PHYSICAL THERAPIST

## 2018-12-18 PROCEDURE — 97110 THERAPEUTIC EXERCISES: CPT | Performed by: PHYSICAL THERAPIST

## 2018-12-20 ENCOUNTER — APPOINTMENT (OUTPATIENT)
Dept: PHYSICAL THERAPY | Facility: CLINIC | Age: 43
End: 2018-12-20
Payer: COMMERCIAL

## 2018-12-26 ENCOUNTER — APPOINTMENT (OUTPATIENT)
Dept: PHYSICAL THERAPY | Facility: CLINIC | Age: 43
End: 2018-12-26
Payer: COMMERCIAL

## 2019-01-08 ENCOUNTER — EVALUATION (OUTPATIENT)
Dept: PHYSICAL THERAPY | Facility: CLINIC | Age: 44
End: 2019-01-08
Payer: COMMERCIAL

## 2019-01-08 DIAGNOSIS — M16.12 PRIMARY OSTEOARTHRITIS OF LEFT HIP: Primary | ICD-10-CM

## 2019-01-08 PROCEDURE — 97110 THERAPEUTIC EXERCISES: CPT | Performed by: PHYSICAL THERAPIST

## 2019-01-08 PROCEDURE — 97140 MANUAL THERAPY 1/> REGIONS: CPT | Performed by: PHYSICAL THERAPIST

## 2019-01-08 NOTE — LETTER
2019    Mily Hernandez MD  [address] [facility] [address] [facility]    Patient: Renetta Jesus   YOB: 1975   Date of Visit: 2019     Encounter Diagnosis     ICD-10-CM    1  Primary osteoarthritis of left hip M16 12        Dear Dr Curry Memory:    Please review the attached Plan of Care from [facility] recent visit  Please verify that you agree therapy should continue by signing the attached document and sending it back to our office  If you have any questions or concerns, please don't hesitate to call  Sincerely,    Atul Da Silva PT      Referring Provider:      I certify that I have read the below Plan of Care and certify the need for these services furnished under this plan of treatment while under my care  Mily Hernandez MD  [address] [place] [address]  VIA Facsimile: [phone]          PT Evaluation     Today's date: 2019  Patient name: Renetta Jesus  : 1975  MRN: [id number]  Referring provider: Steffany Batres MD  Dx:   Encounter Diagnosis     ICD-10-CM    1  Primary osteoarthritis of left hip M16 12                   Assessment  Assessment details: Since starting PT patient displays with decreased radiating pain, improved (L) hip ROM, increased (L) hip strength, and improved function  She is making good progress towards their goals  Patient continues to have restrictions with strength, ROM, and function  At this time it is recommended that she continue with skilled PT to maximize strength, ROM, and function weaning to an I HEP as appropriate  Thank you very much for your referral      Impairments: abnormal gait, abnormal muscle tone, abnormal or restricted ROM, abnormal movement, activity intolerance, impaired balance, impaired physical strength, lacks appropriate home exercise program, pain with function and poor body mechanics  Understanding of Dx/Px/POC: good   Prognosis: fair    Goals  ST  Decrease pain to 5/10 max in 2 weeks  2  Increase (L) hip AROM: IR to 25 degrees in 2 weeks  - met  3  Increase (L) hip and knee strength by 1/2 MMT grade in 2 weeks  - met  4  I with HEP in 2 weeks  - met  5  Improve FOTO score to 46 in 2 weeks  - met  LT  Decrease pain to 3/10 max in 4 weeks  2  Increase (L) hip AROM: IR to 30 degrees in 4 weeks  3  Increase (L) hip and knee strength to 4+/5 all planes in 4 weeks  4  I with HEP in 4 weeks  5  Improve FOTO score to 56 in 4 weeks  Plan  Patient would benefit from: skilled physical therapy  Planned modality interventions: cryotherapy and thermotherapy: hydrocollator packs  Planned therapy interventions: activity modification, joint mobilization, manual therapy, balance, neuromuscular re-education, body mechanics training, patient education, postural training, strengthening, stretching, therapeutic activities, therapeutic exercise, functional ROM exercises, home exercise program and gait training  Frequency: 2x week  Duration in visits: 8  Duration in weeks: 4  Plan of Care beginning date: 2019  Plan of Care expiration date: 2019  Treatment plan discussed with: patient        Subjective Evaluation    History of Present Illness  Mechanism of injury: Patient reports since starting PT she has decreased radiating pain however her joint pain continues  She sees improvement in her strength and has progressed her HEP  She notes improvement with stair negotiation        Quality of life: good    Pain  Current pain ratin  At best pain ratin  At worst pain ratin  Location: (L) Hip   Quality: dull ache and sharp    Social Support  Steps to enter house: yes  Stairs in house: yes   Lives in: multiple-level home  Lives with: spouse    Employment status: not working    Diagnostic Tests  X-ray: abnormal  MRI studies: abnormal  Treatments  Previous treatment: chiropractic, physical therapy, medication and injection treatment  Patient Goals  Patient goals for therapy: decreased pain, increased motion, increased strength, return to sport/leisure activities and improved balance  Patient goal: Return to active lifestyle - manage symptoms - prolong surgery        Objective     Static Posture   General Observations  Symmetrical weight bearing  Lumbar Spine   Decreased lordosis  Palpation     Additional Palpation Details  TTP (L) Hip anterior and proximal to greater trochanter - no tenderness at greater trochanter    Lumbar Screen  Lumbar range of motion within normal limits with the following exceptions:NE with repeated movement exam    Active Range of Motion   Left Hip   Flexion: 120 degrees   Abduction: 32 degrees   External rotation (90/90): 35 degrees   Internal rotation (90/90): 25 degrees     Right Hip   External rotation (90/90): 40 degrees   Internal rotation (90/90): 30 degrees     Strength/Myotome Testing     Left Hip   Planes of Motion   Flexion: 4+  Extension: 4  Abduction: 4+  External rotation: 4  Internal rotation: 4    Right Hip   Planes of Motion   Flexion: 4+  Extension: 4+  Abduction: 4+  External rotation: 4+  Internal rotation: 4+    Left Knee   Flexion: 4+  Extension: 4+    Right Knee   Flexion: 4+  Extension: 4+    Left Ankle/Foot   Dorsiflexion: 4+  Plantar flexion: 4+    Right Ankle/Foot   Dorsiflexion: 4+  Plantar flexion: 4+    Tests     Left Hip   Positive VITOR and scour  Additional Tests Details  90/90 HS Lag: (B) 0 degrees     Ambulation     Observational Gait   Walking speed and stride length within functional limits     Left foot contact pattern: heel to toe  Right foot contact pattern: heel to toe  Left arm swing: within functional limits  Right arm swing: within functional limits  Base of support: normal        Daily Treatment Diary     DX: (L) Hip OA  EPOC: 1/10/19  Precautions: standard  CO-MORBIDITES: (L) Hip labral tear  PERSONAL FACTORS: N/A    Manual  12/18 1/8        (L) Hip Mobs LAD  Inferior  LAT  Gr 3/4   5 min LAD and LAT  Gr 3/4   5 min        (L) Hip PROM 5 min 5 min                                          Exercise Diary  12/18 1/8        Elliptical  5' 5'                  SL Balance 30"x3          Std Hip Flex  10x ea        Std Hip ABD  10x ea        Std Hip Ext  10x ea        HS Curls  10x ea        Mini Squats 5"x10         FWD Mini Lunge 10x ea         LAT Mini Lunge 10x ea         FWD Step Up          LAT Step Up                    SLR 5"x10 ea 5"x10 ea        S/L Hip ABD 10x ea 10x ea        PHE 10x ea 10x ea        Bridge with [place] Hip ABD 5"x10  5"x10                   TB Hip IR GTB  10x         TB Hip ER GTB  10x                       Modalities

## 2019-01-08 NOTE — PROGRESS NOTES
PT Evaluation     Today's date: 2019  Patient name: Jonnie García  : 1975  MRN: [id number]  Referring provider: Ruby Weinstein MD  Dx:   Encounter Diagnosis     ICD-10-CM    1  Primary osteoarthritis of left hip M16 12                   Assessment  Assessment details: Since starting PT patient displays with decreased radiating pain, improved (L) hip ROM, increased (L) hip strength, and improved function  She is making good progress towards their goals  Patient continues to have restrictions with strength, ROM, and function  At this time it is recommended that she continue with skilled PT to maximize strength, ROM, and function weaning to an I HEP as appropriate  Thank you very much for your referral      Impairments: abnormal gait, abnormal muscle tone, abnormal or restricted ROM, abnormal movement, activity intolerance, impaired balance, impaired physical strength, lacks appropriate home exercise program, pain with function and poor body mechanics  Understanding of Dx/Px/POC: good   Prognosis: fair    Goals  ST  Decrease pain to 5/10 max in 2 weeks  2  Increase (L) hip AROM: IR to 25 degrees in 2 weeks  - met  3  Increase (L) hip and knee strength by 1/2 MMT grade in 2 weeks  - met  4  I with HEP in 2 weeks  - met  5  Improve FOTO score to 46 in 2 weeks  - met  LT  Decrease pain to 3/10 max in 4 weeks  2  Increase (L) hip AROM: IR to 30 degrees in 4 weeks  3  Increase (L) hip and knee strength to 4+/5 all planes in 4 weeks  4  I with HEP in 4 weeks  5  Improve FOTO score to 56 in 4 weeks        Plan  Patient would benefit from: skilled physical therapy  Planned modality interventions: cryotherapy and thermotherapy: hydrocollator packs  Planned therapy interventions: activity modification, joint mobilization, manual therapy, balance, neuromuscular re-education, body mechanics training, patient education, postural training, strengthening, stretching, therapeutic activities, therapeutic exercise, functional ROM exercises, home exercise program and gait training  Frequency: 2x week  Duration in visits: 8  Duration in weeks: 4  Plan of Care beginning date: 2019  Plan of Care expiration date: 2019  Treatment plan discussed with: patient        Subjective Evaluation    History of Present Illness  Mechanism of injury: Patient reports since starting PT she has decreased radiating pain however her joint pain continues  She sees improvement in her strength and has progressed her HEP  She notes improvement with stair negotiation  Quality of life: good    Pain  Current pain ratin  At best pain ratin  At worst pain ratin  Location: (L) Hip   Quality: dull ache and sharp    Social Support  Steps to enter house: yes  Stairs in house: yes   Lives in: multiple-level home  Lives with: spouse    Employment status: not working    Diagnostic Tests  X-ray: abnormal  MRI studies: abnormal  Treatments  Previous treatment: chiropractic, physical therapy, medication and injection treatment  Patient Goals  Patient goals for therapy: decreased pain, increased motion, increased strength, return to sport/leisure activities and improved balance  Patient goal: Return to active lifestyle - manage symptoms - prolong surgery        Objective     Static Posture   General Observations  Symmetrical weight bearing  Lumbar Spine   Decreased lordosis       Palpation     Additional Palpation Details  TTP (L) Hip anterior and proximal to greater trochanter - no tenderness at greater trochanter    Lumbar Screen  Lumbar range of motion within normal limits with the following exceptions:NE with repeated movement exam    Active Range of Motion   Left Hip   Flexion: 120 degrees   Abduction: 32 degrees   External rotation (90/90): 35 degrees   Internal rotation (90/90): 25 degrees     Right Hip   External rotation (90/90): 40 degrees   Internal rotation (90/90): 30 degrees     Strength/Myotome Testing Left Hip   Planes of Motion   Flexion: 4+  Extension: 4  Abduction: 4+  External rotation: 4  Internal rotation: 4    Right Hip   Planes of Motion   Flexion: 4+  Extension: 4+  Abduction: 4+  External rotation: 4+  Internal rotation: 4+    Left Knee   Flexion: 4+  Extension: 4+    Right Knee   Flexion: 4+  Extension: 4+    Left Ankle/Foot   Dorsiflexion: 4+  Plantar flexion: 4+    Right Ankle/Foot   Dorsiflexion: 4+  Plantar flexion: 4+    Tests     Left Hip   Positive VITOR and scour  Additional Tests Details  90/90 HS Lag: (B) 0 degrees     Ambulation     Observational Gait   Walking speed and stride length within functional limits     Left foot contact pattern: heel to toe  Right foot contact pattern: heel to toe  Left arm swing: within functional limits  Right arm swing: within functional limits  Base of support: normal        Daily Treatment Diary     DX: (L) Hip OA  EPOC: 1/10/19  Precautions: standard  CO-MORBIDITES: (L) Hip labral tear  PERSONAL FACTORS: N/A    Manual  12/18 1/8        (L) Hip Mobs LAD  Inferior  LAT  Gr 3/4   5 min LAD and LAT  Gr 3/4   5 min        (L) Hip PROM 5 min 5 min                                          Exercise Diary  12/18 1/8        Elliptical  5' 5'                  SL Balance 30"x3          Std Hip Flex  10x ea        Std Hip ABD  10x ea        Std Hip Ext  10x ea        HS Curls  10x ea        Mini Squats 5"x10         FWD Mini Lunge 10x ea         LAT Mini Lunge 10x ea         FWD Step Up          LAT Step Up                    SLR 5"x10 ea 5"x10 ea        S/L Hip ABD 10x ea 10x ea        PHE 10x ea 10x ea        Bridge with OH Hip ABD 5"x10  5"x10                   TB Hip IR GTB  10x         TB Hip ER GTB  10x                       Modalities

## 2019-01-15 ENCOUNTER — OFFICE VISIT (OUTPATIENT)
Dept: PHYSICAL THERAPY | Facility: CLINIC | Age: 44
End: 2019-01-15
Payer: COMMERCIAL

## 2019-01-15 DIAGNOSIS — M16.12 PRIMARY OSTEOARTHRITIS OF LEFT HIP: Primary | ICD-10-CM

## 2019-01-15 PROCEDURE — 97110 THERAPEUTIC EXERCISES: CPT | Performed by: PHYSICAL THERAPIST

## 2019-01-15 PROCEDURE — 97140 MANUAL THERAPY 1/> REGIONS: CPT | Performed by: PHYSICAL THERAPIST

## 2019-01-15 NOTE — PROGRESS NOTES
Daily Note     Today's date: 1/15/2019  Patient name: Elijah Arrieta  : 1975  MRN: [id number]  Referring provider: Nancy Robbins MD  Dx:   Encounter Diagnosis     ICD-10-CM    1  Primary osteoarthritis of left hip M16 12                   Subjective: Patient reports good tolerance to her LV  She notes some aching lasting less than 12 hours  She reports over the weekend she had increased discomfort and pinching sensation in the hip  She reports she was able to perform aquatic exercises  Objective: See treatment diary below      Assessment: Patient completed documented program   Patient continues to respond well to manual treatment  She reported decreased pain and improved mobility following manual treatment  Patient had slight increase in pain with standing exercises  She had no complaints with mat-based exercises  She reported decreased pain following LAD to end treatment  Plan: Continue per plan of care         Daily Treatment Diary     DX: (L) Hip OA  EPOC: 19  Precautions: standard  CO-MORBIDITES: (L) Hip labral tear  PERSONAL FACTORS: N/A    Manual  12/18 1/8 1/15       (L) Hip Mobs LAD  Inferior  LAT  Gr 3/4   5 min LAD and LAT  Gr 3/4   5 min LAD and LAT  Gr 3/4   5 min       (L) Hip PROM 5 min 5 min 5 min                                         Exercise Diary  12/18 1/8 1/15       Elliptical  5' 5' 5'                 SL Balance 30"x3   15"x3 ea       Std Hip Flex  10x ea 10x ea       Std Hip ABD  10x ea 10x ea       Std Hip Ext  10x ea 10x ea       HS Curls  10x ea 10x ea       Mini Squats 5"x10         FWD Mini Lunge 10x ea  10x ea       LAT Mini Lunge 10x ea  10x ea       FWD Step Up          LAT Step Up                    SLR 5"x10 ea 5"x10 ea 5"x10 ea       S/L Hip ABD 10x ea 10x ea 10x ea       PHE 10x ea 10x ea 10x ea       Bridge with MD Hip ABD 5"x10  5"x10  5"x15                 TB Hip IR GTB  10x         TB Hip ER GTB  10x                       Modalities

## 2019-01-22 ENCOUNTER — OFFICE VISIT (OUTPATIENT)
Dept: PHYSICAL THERAPY | Facility: CLINIC | Age: 44
End: 2019-01-22
Payer: COMMERCIAL

## 2019-01-22 DIAGNOSIS — M16.12 PRIMARY OSTEOARTHRITIS OF LEFT HIP: Primary | ICD-10-CM

## 2019-01-22 PROCEDURE — 97110 THERAPEUTIC EXERCISES: CPT

## 2019-01-22 PROCEDURE — 97140 MANUAL THERAPY 1/> REGIONS: CPT

## 2019-01-22 NOTE — PROGRESS NOTES
Daily Note     Today's date: 2019  Patient name: Laquita Wagner  : 1975  MRN: [id number]  Referring provider: Huy Ewing MD  Dx:   Encounter Diagnosis     ICD-10-CM    1  Primary osteoarthritis of left hip M16 12                   Subjective: Patient reports she has been feeling a little better in some aspects but the pain has actually increased  Objective: See treatment diary below      Assessment: Patient completed documented program   Patient continues to respond well to manual therapy pre and post TE's  She reported decreased pain and improved mobility following manual treatment  Patient had slight increase in pain with standing exercises  She reported decreased pain following LAD to end treatment  Plan: Continue per plan of care         Daily Treatment Diary     DX: (L) Hip OA  EPOC: 19  Precautions: standard  CO-MORBIDITES: (L) Hip labral tear  PERSONAL FACTORS: N/A    Manual  12/18 1/8 1/15 1/22      (L) Hip Mobs LAD  Inferior  LAT  Gr 3/4   5 min LAD and LAT  Gr 3/4   5 min LAD and LAT  Gr 3/4   5 min JA  PT pre  ME PT  post        (L) Hip PROM 5 min 5 min 5 min 5'                                        Exercise Diary  12/18 1/8 1/15 1/22      Elliptical  5' 5' 5'       Recumbent Bike    5'      SL Balance 30"x3   15"x3 ea       Std Hip Flex  10x ea 10x ea 10x2      Std Hip ABD  10x ea 10x ea 10x2      Std Hip Ext  10x ea 10x ea 10x2      HS Curls  10x ea 10x ea 10x2      Mini Squats 5"x10         FWD Mini Lunge 10x ea  10x ea 10 ea      LAT Mini Lunge 10x ea  10x ea 10 ea      FWD Step Up    8"  x10      LAT Step Up    8"x10                SLR 5"x10 ea 5"x10 ea 5"x10 ea 5"x10      S/L Hip ABD 10x ea 10x ea 10x ea 10x2      PHE 10x ea 10x ea 10x ea 10x2      Bridge with WA Hip ABD 5"x10  5"x10  5"x15 5"x10                TB Hip IR GTB  10x         TB Hip ER GTB  10x                       Modalities

## 2019-02-05 ENCOUNTER — EVALUATION (OUTPATIENT)
Dept: PHYSICAL THERAPY | Facility: CLINIC | Age: 44
End: 2019-02-05
Payer: COMMERCIAL

## 2019-02-05 DIAGNOSIS — M16.12 PRIMARY OSTEOARTHRITIS OF LEFT HIP: Primary | ICD-10-CM

## 2019-02-05 PROCEDURE — 97140 MANUAL THERAPY 1/> REGIONS: CPT | Performed by: PHYSICAL THERAPIST

## 2019-02-05 PROCEDURE — 97110 THERAPEUTIC EXERCISES: CPT | Performed by: PHYSICAL THERAPIST

## 2019-02-05 NOTE — PROGRESS NOTES
PT Re-Evaluation     Today's date: 2019  Patient name: Maddie Verde  : 1975  MRN: [id number]  Referring provider: Nereyda Saavedra MD  Dx:   Encounter Diagnosis     ICD-10-CM    1  Primary osteoarthritis of left hip M16 12                   Assessment  Assessment details: Since starting PT patient displays with decreased radiating pain, improved (L) hip ROM, increased (L) hip strength, and improved function  She has made good progress with PT however she continues to have functional restrictions that appear to require surgical intervention at this time  It is recommended that she continues with an I HEP in preparation for upcoming surgery  Thank you very much for your referral      Impairments: abnormal gait, abnormal muscle tone, abnormal or restricted ROM, abnormal movement, activity intolerance, impaired balance, impaired physical strength, lacks appropriate home exercise program, pain with function and poor body mechanics  Understanding of Dx/Px/POC: good   Prognosis: fair    Goals  ST  Decrease pain to 5/10 max in 2 weeks  2  Increase (L) hip AROM: IR to 25 degrees in 2 weeks  - met  3  Increase (L) hip and knee strength by 1/2 MMT grade in 2 weeks  - met  4  I with HEP in 2 weeks  - met  5  Improve FOTO score to 46 in 2 weeks  - met  LT  Decrease pain to 3/10 max in 4 weeks  2  Increase (L) hip AROM: IR to 30 degrees in 4 weeks  3  Increase (L) hip and knee strength to 4+/5 all planes in 4 weeks  4  I with HEP in 4 weeks  5  Improve FOTO score to 56 in 4 weeks        Plan  Patient would benefit from: skilled physical therapy  Planned modality interventions: cryotherapy and thermotherapy: hydrocollator packs  Planned therapy interventions: activity modification, joint mobilization, manual therapy, balance, neuromuscular re-education, body mechanics training, patient education, postural training, strengthening, stretching, therapeutic activities, therapeutic exercise, functional ROM exercises, home exercise program and gait training  Treatment plan discussed with: patient        Subjective Evaluation    History of Present Illness  Mechanism of injury: Patient reports since her her last reassessment she sees persistent variability in pain levels however her radiating pain is improved  She sees improvements with mobility and daily activities  She also notes improvements with her strength levels  Patient reports persistent difficulty with higher level activities involving bending and lifting  She reports she is I with her HEP  Quality of life: good    Pain  Current pain ratin  At best pain ratin  At worst pain ratin  Location: (L) Hip   Quality: dull ache and sharp    Social Support  Steps to enter house: yes  Stairs in house: yes   Lives in: multiple-level home  Lives with: spouse    Employment status: not working    Diagnostic Tests  X-ray: abnormal  MRI studies: abnormal  Treatments  Previous treatment: chiropractic, physical therapy, medication and injection treatment  Patient Goals  Patient goals for therapy: decreased pain, increased motion, increased strength, return to sport/leisure activities and improved balance  Patient goal: Return to active lifestyle - manage symptoms - prolong surgery        Objective     Static Posture   General Observations  Symmetrical weight bearing  Lumbar Spine   Decreased lordosis       Palpation     Additional Palpation Details  TTP (L) Hip anterior and proximal to greater trochanter - no tenderness at greater trochanter    Lumbar Screen  Lumbar range of motion within normal limits with the following exceptions:NE with repeated movement exam    Active Range of Motion   Left Hip   Flexion: 125 degrees   Abduction: 32 degrees   External rotation (90/90): 35 degrees   Internal rotation (90/90): 30 degrees     Right Hip   External rotation (90/90): 40 degrees   Internal rotation (90/90): 30 degrees     Strength/Myotome Testing     Left Hip   Planes of Motion   Flexion: 4+  Extension: 4  Abduction: 4+  External rotation: 4+  Internal rotation: 4+    Right Hip   Planes of Motion   Flexion: 4+  Extension: 4+  Abduction: 4+  External rotation: 4+  Internal rotation: 4+    Left Knee   Flexion: 4+  Extension: 4+    Right Knee   Flexion: 4+  Extension: 4+    Left Ankle/Foot   Dorsiflexion: 4+  Plantar flexion: 4+    Right Ankle/Foot   Dorsiflexion: 4+  Plantar flexion: 4+    Tests     Left Hip   Positive VITOR and yohannes  Additional Tests Details  90/90 HS Lag: (B) 0 degrees     Ambulation     Observational Gait   Walking speed and stride length within functional limits     Left foot contact pattern: heel to toe  Right foot contact pattern: heel to toe  Left arm swing: within functional limits  Right arm swing: within functional limits  Base of support: normal        Daily Treatment Diary     DX: (L) Hip OA  EPOC: 1/10/19  Precautions: standard  CO-MORBIDITES: (L) Hip labral tear  PERSONAL FACTORS: N/A    Manual  12/18 1/8 2/5       (L) Hip Mobs LAD  Inferior  LAT  Gr 3/4   5 min LAD and LAT  Gr 3/4   5 min        (L) Hip PROM 5 min 5 min 7'       (L) posterior innominate MET   3'                               Exercise Diary  12/18 1/8        Elliptical  5' 5'                  SL Balance 30"x3          Std Hip Flex  10x ea        Std Hip ABD  10x ea        Std Hip Ext  10x ea        HS Curls  10x ea        Mini Squats 5"x10         FWD Mini Lunge 10x ea         LAT Mini Lunge 10x ea         FWD Step Up          LAT Step Up                    SLR 5"x10 ea 5"x10 ea        S/L Hip ABD 10x ea 10x ea        PHE 10x ea 10x ea        Bridge with CA Hip ABD 5"x10  5"x10                   TB Hip IR GTB  10x         TB Hip ER GTB  10x                       Modalities

## 2019-05-08 ENCOUNTER — TRANSCRIBE ORDERS (OUTPATIENT)
Dept: ADMINISTRATIVE | Facility: HOSPITAL | Age: 44
End: 2019-05-08

## 2019-05-08 DIAGNOSIS — Z12.31 VISIT FOR SCREENING MAMMOGRAM: Primary | ICD-10-CM

## 2019-06-07 ENCOUNTER — HOSPITAL ENCOUNTER (OUTPATIENT)
Dept: BONE DENSITY | Facility: IMAGING CENTER | Age: 44
Discharge: HOME | End: 2019-06-07
Payer: COMMERCIAL

## 2019-06-07 VITALS — BODY MASS INDEX: 29.73 KG/M2 | WEIGHT: 185 LBS | HEIGHT: 66 IN

## 2019-06-07 DIAGNOSIS — Z12.31 VISIT FOR SCREENING MAMMOGRAM: ICD-10-CM

## 2019-06-07 PROCEDURE — 77067 SCR MAMMO BI INCL CAD: CPT

## 2020-06-12 ENCOUNTER — TRANSCRIBE ORDERS (OUTPATIENT)
Dept: ADMINISTRATIVE | Facility: HOSPITAL | Age: 45
End: 2020-06-12

## 2020-06-12 DIAGNOSIS — Z12.31 VISIT FOR SCREENING MAMMOGRAM: Primary | ICD-10-CM

## 2020-06-19 ENCOUNTER — HOSPITAL ENCOUNTER (OUTPATIENT)
Dept: BONE DENSITY | Facility: IMAGING CENTER | Age: 45
Discharge: HOME | End: 2020-06-19
Payer: COMMERCIAL

## 2020-06-19 VITALS — BODY MASS INDEX: 29.73 KG/M2 | HEIGHT: 66 IN | WEIGHT: 185 LBS

## 2020-06-19 DIAGNOSIS — Z12.31 VISIT FOR SCREENING MAMMOGRAM: ICD-10-CM

## 2020-06-19 PROCEDURE — 77067 SCR MAMMO BI INCL CAD: CPT

## 2020-06-19 PROCEDURE — 77063 BREAST TOMOSYNTHESIS BI: CPT

## 2020-07-02 ENCOUNTER — HOSPITAL ENCOUNTER (OUTPATIENT)
Dept: MAMMOGRAPHY | Facility: CLINIC | Age: 45
Discharge: HOME | End: 2020-07-02
Payer: COMMERCIAL

## 2020-07-02 ENCOUNTER — HOSPITAL ENCOUNTER (OUTPATIENT)
Dept: ULTRASOUND IMAGING | Facility: CLINIC | Age: 45
Discharge: HOME | End: 2020-07-02
Payer: COMMERCIAL

## 2020-07-02 DIAGNOSIS — R92.8 ABNORMAL SCREENING MAMMOGRAM: ICD-10-CM

## 2020-07-02 PROCEDURE — G0279 TOMOSYNTHESIS, MAMMO: HCPCS

## 2020-07-02 PROCEDURE — 77065 DX MAMMO INCL CAD UNI: CPT

## 2020-07-07 NOTE — PROGRESS NOTES
Daily Note     Today's date: 2018  Patient name: Phong Karimi  : 1975  MRN: [id number]  Referring provider: Tobin Dumont MD  Dx:   Encounter Diagnosis     ICD-10-CM    1  Primary osteoarthritis of left hip M16 12                   Subjective: Patient reports slight increase in pain following her initial evaluation lasting about 24 hours  She reports today she is feeling better  She participated in aquatic exercise this morning - fatigued with exercises but overall she felt no increase in pain  Objective: See treatment diary below      Assessment: Patient completed documented program   Patient reported less discomfort following manual treatment and improved motion  She demonstrated good form with exercises  Patient had slight discomfort with squats and lunges - modified range  Patient reported no increased pain post treatment  Plan: Continue per plan of care       Daily Treatment Diary     DX: (L) Hip OA  EPOC: 1/10/19  Precautions: standard  CO-MORBIDITES: (L) Hip labral tear  PERSONAL FACTORS: N/A    Manual           (L) Hip Mobs LAD  Inferior  LAT  Gr 3/4   5 min         (L) Hip PROM 5 min                                           Exercise Diary           Elliptical  5'                   SL Balance 30"x3          Std Hip Flex          Std Hip ABD          Std Hip Ext          HS Curls          Mini Squats 5"x10         FWD Mini Lunge 10x ea         LAT Mini Lunge 10x ea         FWD Step Up          LAT Step Up                    SLR 5"x10 ea         S/L Hip ABD 10x ea         PHE 10x ea         Bridge with NM Hip ABD 5"x10 ea                   TB Hip IR GTB  10x         TB Hip ER GTB  10x                       Modalities
Normal for race

## 2021-03-02 ENCOUNTER — TRANSCRIBE ORDERS (OUTPATIENT)
Dept: ADMINISTRATIVE | Facility: HOSPITAL | Age: 46
End: 2021-03-02

## 2021-03-02 ENCOUNTER — HOSPITAL ENCOUNTER (OUTPATIENT)
Dept: RADIOLOGY | Facility: HOSPITAL | Age: 46
Discharge: HOME | End: 2021-03-02
Payer: COMMERCIAL

## 2021-03-02 DIAGNOSIS — M25.571 RIGHT ANKLE PAIN, UNSPECIFIED CHRONICITY: ICD-10-CM

## 2021-03-02 DIAGNOSIS — M79.671 RIGHT FOOT PAIN: ICD-10-CM

## 2021-03-02 DIAGNOSIS — M79.671 RIGHT FOOT PAIN: Primary | ICD-10-CM

## 2021-03-02 PROCEDURE — 73650 X-RAY EXAM OF HEEL: CPT

## 2021-03-02 PROCEDURE — 73610 X-RAY EXAM OF ANKLE: CPT

## 2021-03-10 DIAGNOSIS — Z23 ENCOUNTER FOR IMMUNIZATION: ICD-10-CM

## 2021-03-29 ENCOUNTER — IMMUNIZATIONS (OUTPATIENT)
Dept: FAMILY MEDICINE CLINIC | Facility: HOSPITAL | Age: 46
End: 2021-03-29

## 2021-03-29 DIAGNOSIS — Z23 ENCOUNTER FOR IMMUNIZATION: Primary | ICD-10-CM

## 2021-03-29 PROCEDURE — 91301 SARS-COV-2 / COVID-19 MRNA VACCINE (MODERNA) 100 MCG: CPT

## 2021-03-29 PROCEDURE — 0011A SARS-COV-2 / COVID-19 MRNA VACCINE (MODERNA) 100 MCG: CPT

## 2021-03-30 ENCOUNTER — OFFICE VISIT (OUTPATIENT)
Dept: GASTROENTEROLOGY | Facility: CLINIC | Age: 46
End: 2021-03-30
Payer: COMMERCIAL

## 2021-03-30 VITALS
BODY MASS INDEX: 31.82 KG/M2 | HEART RATE: 93 BPM | WEIGHT: 198 LBS | HEIGHT: 66 IN | SYSTOLIC BLOOD PRESSURE: 138 MMHG | DIASTOLIC BLOOD PRESSURE: 86 MMHG

## 2021-03-30 DIAGNOSIS — R19.7 DIARRHEA, UNSPECIFIED TYPE: Primary | ICD-10-CM

## 2021-03-30 DIAGNOSIS — R10.13 EPIGASTRIC PAIN: ICD-10-CM

## 2021-03-30 DIAGNOSIS — Z12.11 COLON CANCER SCREENING: ICD-10-CM

## 2021-03-30 DIAGNOSIS — U07.1 COVID-19 VIRUS INFECTION: ICD-10-CM

## 2021-03-30 PROCEDURE — 99244 OFF/OP CNSLTJ NEW/EST MOD 40: CPT | Performed by: INTERNAL MEDICINE

## 2021-03-30 PROCEDURE — 3008F BODY MASS INDEX DOCD: CPT | Performed by: INTERNAL MEDICINE

## 2021-03-30 PROCEDURE — 1036F TOBACCO NON-USER: CPT | Performed by: INTERNAL MEDICINE

## 2021-03-30 RX ORDER — NORGESTREL-ETHINYL ESTRADIOL 0.3-0.03MG
1 TABLET ORAL DAILY
COMMUNITY
Start: 2021-02-26

## 2021-03-30 RX ORDER — MELOXICAM 15 MG/1
15 TABLET ORAL
COMMUNITY
Start: 2021-03-04 | End: 2021-12-01 | Stop reason: ALTCHOICE

## 2021-03-30 RX ORDER — DICYCLOMINE HCL 20 MG
20 TABLET ORAL 4 TIMES DAILY
COMMUNITY
Start: 2021-03-08

## 2021-03-30 RX ORDER — LEVOCETIRIZINE DIHYDROCHLORIDE 5 MG/1
TABLET, FILM COATED ORAL
COMMUNITY

## 2021-03-30 RX ORDER — FLUTICASONE PROPIONATE 50 MCG
SPRAY, SUSPENSION (ML) NASAL
COMMUNITY

## 2021-03-30 RX ORDER — METOPROLOL SUCCINATE 25 MG/1
25 TABLET, EXTENDED RELEASE ORAL DAILY
COMMUNITY
Start: 2021-02-28

## 2021-03-30 RX ORDER — HYDROCHLOROTHIAZIDE 25 MG/1
25 TABLET ORAL DAILY
COMMUNITY
Start: 2021-02-28

## 2021-03-30 RX ORDER — SODIUM, POTASSIUM,MAG SULFATES 17.5-3.13G
SOLUTION, RECONSTITUTED, ORAL ORAL
Qty: 2 BOTTLE | Refills: 0 | Status: SHIPPED | OUTPATIENT
Start: 2021-03-30 | End: 2021-04-28 | Stop reason: HOSPADM

## 2021-03-30 NOTE — TELEPHONE ENCOUNTER
Why does your doctor want you to have this procedure? diarrhea    Do you have kidney disease?  no  If yes, are you on dialysis :     Have you had diverticulitis within the past 2 months? no    Are you diabetic?  no  If yes, insulin dependent: If yes, provide diabetic instructions sheet     Do take iron supplements?  no  If yes, instruct patient to hold iron supplement for 7 days prior    Are you on a blood thinner? no   Was the blood thinner sheet complete and faxed to cardiologist no  Plavix (clopidogrel), Coumadin (warfarin), Lovenox (enoxaparin), Xarelto (rivaroxaban), Pradaxa(dabigatran), Eliquis(apixaban) Savaysa/Lixiana (edoxapan)    Do you have an automatic implantable cardiac defibrillator (AICD)/pacemaker ([facility])? no  Was AICD/pacemaker sheet completed and faxed to cardiologist? no    Are you on home oxygen? no  If yes, continuous or nocturnal:     Have you been treated for MRSA, VRE or any communicable diseases? no    Heart attack, stroke, or stent within 3 months? no  Schedule at Hospital if within 3-6 months   Use nitroglycerin for chest pain in the last 6 months? no    History of organ  transplant?  no   If yes, notify Endo      History of neck/throat/tongue surgery or cancer? no  IF yes, notify Endo      Any problems with anesthesia in the past? no     Was stool C diff ordered?  no Stool specimen needs to be completed prior to procedure    Do have any facial or body piercings?no     Do you have a latex allergy? no     Do have an allergy to metals? (Bravo study only) no     If pediatric patient, was consent faxed to pediatrician no     Patient rights reviewed yes     Rx sent for Suprep to provider for signature  Instructions emailed to patient

## 2021-03-30 NOTE — PROGRESS NOTES
[id number] - Outpatient Consultation  Layton Gallagher 54 y o  female MRN: [id number]  Encounter: [id number]    ASSESSMENT AND PLAN:      1  Diarrhea, unspecified type  45F pmhx sig arthritis, on NSAIDS, recent COVID infection in mid January, now with ongoing abd cramping, pain, and diarrhea  Wakes pt up in the middle of the night  Concerning for post infectious IBS, but given chronic NSAIDS, will rule out [facility] and IBD  Stool studies incl CDiff negative  CMP, CBC normal   Normal Celiac Panel  Continue Bentyl and Porbiotics  - Schedule Colonoscopy @ [address] [place] [address] Avenue  2  Epigastric pain  Sounds like post infectious gastritis/reflux     - Schedule EGD @ [address] [place] [address]  3  COVID-19 virus infection  1/2021, was sick for about a week  4  Colon cancer screening  avg risk, never had a colonoscopy      Followup Appointment: 6 weeks  ______________________________________________________________________    Chief Complaint   Patient presents with    Abd pain    Abnormal labs     referred by Briana Rodriguez       HPI:   Layton Gallagher is a 54y o  year old female who presents today at the request of Dr Yumiko Montes for abd pain and diarrhea  Pt states this started after her COVID infection in 1/2021  She was sick for about a week  Since she had been having constant diarrhea, wakes her up at night, no GIB  A lot of abd cramping  Started some probiotics and bentyl which helps a bit  No weight loss  Eating ok  No heartburn or nausea/vomiting       Historical Information   Past Medical History:   Diagnosis Date    Allergies     seasonal    Arthritis     Hypertension      Past Surgical History:   Procedure Laterality Date    HIP ARTHROSCOPY W/ LABRAL DEBRIDEMENT Left 07/28/2017     Social History     Substance and Sexual Activity   Alcohol Use Yes    Frequency: 2-3 times a week     Social History     Substance and Sexual Activity   Drug Use Never     Social History     Tobacco Use   Smoking Status Former Smoker   Smokeless Tobacco Never Used     Family History   Problem Relation Age of Onset    No Known Problems Mother     No Known Problems Father     No Known Problems Sister     No Known Problems Daughter     No Known Problems Maternal Grandmother     No Known Problems Maternal Grandfather     No Known Problems Paternal Grandmother     No Known Problems Paternal Grandfather     No Known Problems Maternal Aunt     No Known Problems Maternal Aunt     Breast cancer Paternal Aunt         about 20 years ago    No Known Problems Paternal Aunt     Colon polyps Neg Hx     Colon cancer Neg Hx        Meds/Allergies     Current Outpatient Medications:     Cryselle-28 0 3-30 MG-MCG per tablet    dicyclomine (BENTYL) 20 mg tablet    fluticasone (Flonase) 50 mcg/act nasal spray    hydrochlorothiazide (HYDRODIURIL) 25 mg tablet    Lactobacillus-Inulin (CULTUREE DIGESTIVE HEALTH PO)    levocetirizine (Xyzal Allergy 24HR) 5 MG tablet    meloxicam (MOBIC) 15 mg tablet    metoprolol succinate (TOPROL-XL) 25 mg 24 hr tablet    No Known Allergies    PHYSICAL EXAM:    Blood pressure 138/86, pulse 93, height 5' 6" (1 676 m), weight 89 8 kg (198 lb)  Body mass index is 31 96 kg/m²  General Appearance: NAD, cooperative, alert  Eyes: Anicteric, PERRLA, EOMI  ENT:  Normocephalic, atraumatic, normal mucosa  Neck:  Supple, symmetrical, trachea midline,   Resp:  Clear to auscultation bilaterally; no rales, rhonchi or wheezing; respirations unlabored   CV:  S1 S2, Regular rate and rhythm; no murmur, rub, or gallop  GI:  Soft, non-tender, non-distended; normal bowel sounds; no masses, no organomegaly   Rectal: Deferred  Musculoskeletal: No cyanosis, clubbing or edema  Normal ROM    Skin:  No jaundice, rashes, or lesions   Heme/Lymph: No palpable cervical lymphadenopathy  Psych: Normal affect, good eye contact  Neuro: No gross deficits, AAOx3    Lab Results:   Normal stool studies, CBC/CMP, CELIAC    REVIEW OF SYSTEMS:    CONSTITUTIONAL: Denies any fever, chills, rigors, and weight loss  HEENT: No earache or tinnitus  Denies hearing loss or visual disturbances  CARDIOVASCULAR: No chest pain or palpitations  RESPIRATORY: Denies any cough, hemoptysis, shortness of breath or dyspnea on exertion  GASTROINTESTINAL: As noted in the History of Present Illness  GENITOURINARY: No problems with urination  Denies any hematuria or dysuria  NEUROLOGIC: No dizziness or vertigo, denies headaches  MUSCULOSKELETAL: Chronic joint pains  SKIN: Denies skin rashes or itching  ENDOCRINE: Denies excessive thirst  Denies intolerance to heat or cold  PSYCHOSOCIAL: Denies depression or anxiety  Denies any recent memory loss

## 2021-04-28 ENCOUNTER — ANESTHESIA EVENT (OUTPATIENT)
Dept: GASTROENTEROLOGY | Facility: AMBULATORY SURGERY CENTER | Age: 46
End: 2021-04-28

## 2021-04-28 ENCOUNTER — HOSPITAL ENCOUNTER (OUTPATIENT)
Dept: GASTROENTEROLOGY | Facility: AMBULATORY SURGERY CENTER | Age: 46
Discharge: HOME | End: 2021-04-28
Payer: COMMERCIAL

## 2021-04-28 ENCOUNTER — ANESTHESIA (OUTPATIENT)
Dept: GASTROENTEROLOGY | Facility: AMBULATORY SURGERY CENTER | Age: 46
End: 2021-04-28

## 2021-04-28 VITALS
TEMPERATURE: 99.1 F | WEIGHT: 198 LBS | HEIGHT: 66 IN | HEART RATE: 94 BPM | RESPIRATION RATE: 21 BRPM | SYSTOLIC BLOOD PRESSURE: 146 MMHG | DIASTOLIC BLOOD PRESSURE: 88 MMHG | OXYGEN SATURATION: 95 % | BODY MASS INDEX: 31.82 KG/M2

## 2021-04-28 DIAGNOSIS — R19.7 DIARRHEA, UNSPECIFIED TYPE: ICD-10-CM

## 2021-04-28 DIAGNOSIS — R10.13 EPIGASTRIC PAIN: ICD-10-CM

## 2021-04-28 LAB
EXT PREGNANCY TEST URINE: NEGATIVE
EXT. CONTROL: NORMAL

## 2021-04-28 PROCEDURE — 45380 COLONOSCOPY AND BIOPSY: CPT | Performed by: INTERNAL MEDICINE

## 2021-04-28 PROCEDURE — 43239 EGD BIOPSY SINGLE/MULTIPLE: CPT | Performed by: INTERNAL MEDICINE

## 2021-04-28 PROCEDURE — 88305 TISSUE EXAM BY PATHOLOGIST: CPT | Performed by: PATHOLOGY

## 2021-04-28 RX ORDER — SODIUM CHLORIDE 9 MG/ML
75 INJECTION, SOLUTION INTRAVENOUS CONTINUOUS
Status: DISCONTINUED | OUTPATIENT
Start: 2021-04-28 | End: 2021-05-02 | Stop reason: HOSPADM

## 2021-04-28 RX ORDER — PROPOFOL 10 MG/ML
INJECTION, EMULSION INTRAVENOUS AS NEEDED
Status: DISCONTINUED | OUTPATIENT
Start: 2021-04-28 | End: 2021-04-28

## 2021-04-28 RX ADMIN — PROPOFOL 50 MG: 10 INJECTION, EMULSION INTRAVENOUS at 08:49

## 2021-04-28 RX ADMIN — PROPOFOL 50 MG: 10 INJECTION, EMULSION INTRAVENOUS at 08:35

## 2021-04-28 RX ADMIN — SODIUM CHLORIDE: 9 INJECTION, SOLUTION INTRAVENOUS at 08:19

## 2021-04-28 RX ADMIN — PROPOFOL 100 MG: 10 INJECTION, EMULSION INTRAVENOUS at 08:39

## 2021-04-28 RX ADMIN — PROPOFOL 150 MG: 10 INJECTION, EMULSION INTRAVENOUS at 08:30

## 2021-04-28 RX ADMIN — PROPOFOL 50 MG: 10 INJECTION, EMULSION INTRAVENOUS at 08:45

## 2021-04-28 NOTE — ANESTHESIA PREPROCEDURE EVALUATION
Procedure:  COLONOSCOPY  EGD    Relevant Problems   CARDIO   (+) Hypertension      MUSCULOSKELETAL   (+) Arthritis      Other   (+) Diarrhea   (+) Epigastric pain        Physical Exam    Airway    Mallampati score: I  TM Distance: >3 FB  Neck ROM: full     Dental   No notable dental hx     Cardiovascular  Rhythm: regular, Rate: normal, Cardiovascular exam normal    Pulmonary  Pulmonary exam normal Breath sounds clear to auscultation,     Other Findings        Anesthesia Plan  ASA Score- 2     Anesthesia Type- IV sedation with anesthesia with ASA Monitors  Additional Monitors:   Airway Plan:           Plan Factors-    Chart reviewed  Patient is not a current smoker  Induction- intravenous  Postoperative Plan-     Informed Consent- Anesthetic plan and risks discussed with patient

## 2021-04-28 NOTE — H&P
History and Physical -  [facility]  Rachel Mejia 54 y o  female MRN: [id number]                  HPI: Rachel Mejia is a 54y o  year old female who presents for abdominal pain and diarrhea  History of COVID infection January 2021  REVIEW OF SYSTEMS: Per the HPI, and otherwise unremarkable      Historical Information   Past Medical History:   Diagnosis Date    Allergies     seasonal    Arthritis     Hypertension      Past Surgical History:   Procedure Laterality Date    HIP ARTHROSCOPY W/ LABRAL DEBRIDEMENT Left 07/28/2017    on both hips     Social History   Social History     Substance and Sexual Activity   Alcohol Use Yes    Frequency: 2-3 times a week     Social History     Substance and Sexual Activity   Drug Use Never     Social History     Tobacco Use   Smoking Status Former Smoker   Smokeless Tobacco Never Used     Family History   Problem Relation Age of Onset    No Known Problems Mother     No Known Problems Father     No Known Problems Sister     No Known Problems Daughter     No Known Problems Maternal Grandmother     No Known Problems Maternal Grandfather     No Known Problems Paternal Grandmother     No Known Problems Paternal Grandfather     No Known Problems Maternal Aunt     No Known Problems Maternal Aunt     Breast cancer Paternal Aunt         about 20 years ago    No Known Problems Paternal Aunt     Colon polyps Neg Hx     Colon cancer Neg Hx        Meds/Allergies     Current Outpatient Medications:     Cryselle-28 0 3-30 MG-MCG per tablet    dicyclomine (BENTYL) 20 mg tablet    fluticasone (Flonase) 50 mcg/act nasal spray    hydrochlorothiazide (HYDRODIURIL) 25 mg tablet    Lactobacillus-Inulin ([facility])    levocetirizine (Xyzal Allergy 24HR) 5 MG tablet    meloxicam (MOBIC) 15 mg tablet    metoprolol succinate (TOPROL-XL) 25 mg 24 hr tablet    Na Sulfate-K Sulfate-Mg Sulf (Suprep Bowel Prep Kit) 17 5-3 13-1 6 GM/177ML SOLN    Current Facility-Administered Medications:     sodium chloride 0 9 % infusion, 75 mL/hr, Intravenous, Continuous    No Known Allergies    Objective     /90   Pulse 97   Temp 99 1 °F (37 3 °C) (Temporal)   Resp 17   Ht 5' 6" (1 676 m)   Wt 89 8 kg (198 lb)   SpO2 95%   BMI 31 96 kg/m²       PHYSICAL EXAM    Gen: NAD AAOx3  CV: S1S2 RRR no m/r/g  CHEST: Clear b/l no c/r/w  ABD: +BS soft, NT/ND  EXT: no edema      ASSESSMENT/PLAN:  This is a 54y o  year old female here for EGD and colonoscopy, and she is stable and optimized for her procedure

## 2021-04-28 NOTE — DISCHARGE INSTRUCTIONS
Colonoscopy   WHAT YOU NEED TO KNOW:   A colonoscopy is a procedure to examine the inside of your colon (intestine) with a scope  Polyps or tissue growths may have been removed during your colonoscopy  It is normal to feel bloated and to have some abdominal discomfort  You should be passing gas  If you have hemorrhoids or you had polyps removed, you may have a small amount of bleeding  DISCHARGE INSTRUCTIONS:   Seek care immediately if:    You have sudden, severe abdominal pain   You have problems swallowing   You have a large amount of black, sticky bowel movements or blood in your bowel movements   You have sudden trouble breathing   You feel weak, lightheaded, or faint or your heart beats faster than normal for you  Contact your healthcare provider if:    You have a fever and chills   You have nausea or are vomiting   Your abdomen is bloated or feels full and hard   You have abdominal pain   You have black, sticky bowel movements or blood in your bowel movements   You have not had a bowel movement for 3 days after your procedure   You have rash or hives   You have questions or concerns about your procedure  Activity:    Do not lift, strain, or run for 24 hours after your procedure   Rest after your procedure  You have been given medicine to relax you  Do not drive or make important decisions until the day after your procedure  Return to your normal activity as directed   Relieve gas and discomfort from bloating by lying on your right side with a heating pad on your abdomen  You may need to take short walks to help the gas move out  Eat small meals until bloating is relieved  Follow up with your healthcare provider as directed: Write down your questions so you remember to ask them during your visits  If you take a blood thinner, please review the specific instructions from your endoscopist about when you should resume it   These can be found in the Recommendation and Your Medication list sections of this After Visit Summary  Hemorrhoids   WHAT YOU NEED TO KNOW:   What are hemorrhoids? Hemorrhoids are swollen blood vessels inside your rectum (internal hemorrhoids) or on your anus (external hemorrhoids)  Sometimes a hemorrhoid may prolapse  This means it extends out of your anus  What increases my risk for hemorrhoids? · Pregnancy or obesity    · Straining or sitting for a long time during bowel movements    · Liver disease    · Weak muscles around the anus caused by older age, rectal surgery, or anal intercourse    · A lack of physical activity    · Chronic diarrhea or constipation    · A low-fiber diet    What are the signs and symptoms of hemorrhoids? · Pain or itching around your anus or inside your rectum    · Swelling or bumps around your anus    · Bright red blood in your bowel movement, on the toilet paper, or in the toilet bowl    · Tissue bulging out of your anus (prolapsed hemorrhoids)    · Incontinence (poor control over urine or bowel movements)    How are hemorrhoids diagnosed? Your healthcare provider will ask about your symptoms, the foods you eat, and your bowel movements  He or she will examine your anus for external hemorrhoids  You may need the following:  · A digital rectal exam  is a test to check for hemorrhoids  Your healthcare provider will put a gloved finger inside your anus to feel for the hemorrhoids  · An anoscopy  is a test that uses a scope (small tube with a light and camera on the end) to look at your hemorrhoids  How are hemorrhoids treated? Treatment will depend on your symptoms  You may need any of the following:  · Medicines  can help decrease pain and swelling, and soften your bowel movement  The medicine may be a pill, pad, cream, or ointment  · Procedures  may be used to shrink or remove your hemorrhoid  Examples include rubber-band ligation, sclerotherapy, and photocoagulation   These procedures may be done in your healthcare provider's office  Ask your healthcare provider for more information about these procedures  · Surgery  may be needed to shrink or remove your hemorrhoids  How can I manage my symptoms? · Apply ice on your anus for 15 to 20 minutes every hour or as directed  Use an ice pack, or put crushed ice in a plastic bag  Cover it with a towel before you apply it to your anus  Ice helps prevent tissue damage and decreases swelling and pain  · Take a sitz bath  Fill a bathtub with 4 to 6 inches of warm water  You may also use a sitz bath pan that fits inside a toilet bowl  Sit in the sitz bath for 15 minutes  Do this 3 times a day, and after each bowel movement  The warm water can help decrease pain and swelling  · Keep your anal area clean  Gently wash the area with warm water daily  Soap may irritate the area  After a bowel movement, wipe with moist towelettes or wet toilet paper  Dry toilet paper can irritate the area  How can I help prevent hemorrhoids? · Do not strain to have a bowel movement  Do not sit on the toilet too long  These actions can increase pressure on the tissues in your rectum and anus  · Drink plenty of liquids  Liquids can help prevent constipation  Ask how much liquid to drink each day and which liquids are best for you  · Eat a variety of high-fiber foods  Examples include fruits, vegetables, and whole grains  Ask your healthcare provider how much fiber you need each day  You may need to take a fiber supplement  · Exercise as directed  Exercise, such as walking, may make it easier to have a bowel movement  Ask your healthcare provider to help you create an exercise plan  · Do not have anal sex  Anal sex can weaken the skin around your rectum and anus  · Avoid heavy lifting  This can cause straining and increase your risk for another hemorrhoid  When should I seek immediate care?    · You have severe pain in your rectum or around your anus  · You have severe pain in your abdomen and you are vomiting  · You have bleeding from your anus that soaks through your underwear  When should I contact my healthcare provider? · You have frequent and painful bowel movements  · Your hemorrhoid looks or feels more swollen than usual      · You do not have a bowel movement for 2 days or more  · You see or feel tissue coming through your anus  · You have questions or concerns about your condition or care  CARE AGREEMENT:   You have the right to help plan your care  Learn about your health condition and how it may be treated  Discuss treatment options with your healthcare providers to decide what care you want to receive  You always have the right to refuse treatment  The above information is an  only  It is not intended as medical advice for individual conditions or treatments  Talk to your doctor, nurse or pharmacist before following any medical regimen to see if it is safe and effective for you  © Copyright [address] Information is for End User's use only and may not be sold, redistributed or otherwise used for commercial purposes   All illustrations and images included in CareNotes® are the copyrighted property of [employer]  or [address] [place] [address]

## 2021-04-29 ENCOUNTER — IMMUNIZATIONS (OUTPATIENT)
Dept: FAMILY MEDICINE CLINIC | Facility: HOSPITAL | Age: 46
End: 2021-04-29

## 2021-04-29 DIAGNOSIS — Z23 ENCOUNTER FOR IMMUNIZATION: Primary | ICD-10-CM

## 2021-04-29 PROCEDURE — 91301 SARS-COV-2 / COVID-19 MRNA VACCINE (MODERNA) 100 MCG: CPT

## 2021-04-29 PROCEDURE — 0012A SARS-COV-2 / COVID-19 MRNA VACCINE (MODERNA) 100 MCG: CPT

## 2021-05-03 NOTE — RESULT ENCOUNTER NOTE
Colon recall 10 years  D/w pt  biopsy showing lymphocytic colitis  At this Point, she feels her diarrhea is getting better  She would like to hold off on any medications  She will call us back if the diarrhea returns

## 2021-09-06 ENCOUNTER — OFFICE VISIT (OUTPATIENT)
Dept: URGENT CARE | Facility: CLINIC | Age: 46
End: 2021-09-06
Payer: COMMERCIAL

## 2021-09-06 VITALS
RESPIRATION RATE: 20 BRPM | HEIGHT: 66 IN | HEART RATE: 75 BPM | OXYGEN SATURATION: 96 % | TEMPERATURE: 97.6 F | DIASTOLIC BLOOD PRESSURE: 90 MMHG | WEIGHT: 205 LBS | SYSTOLIC BLOOD PRESSURE: 142 MMHG | BODY MASS INDEX: 32.95 KG/M2

## 2021-09-06 DIAGNOSIS — B02.9 HERPES ZOSTER WITHOUT COMPLICATION: Primary | ICD-10-CM

## 2021-09-06 PROCEDURE — 99213 OFFICE O/P EST LOW 20 MIN: CPT | Performed by: PHYSICIAN ASSISTANT

## 2021-09-06 RX ORDER — CLINDAMYCIN PHOSPHATE 11.9 MG/ML
SOLUTION TOPICAL
COMMUNITY
Start: 2021-09-05

## 2021-09-06 RX ORDER — ACETAMINOPHEN AND CODEINE PHOSPHATE 120; 12 MG/5ML; MG/5ML
1 SOLUTION ORAL DAILY
COMMUNITY
Start: 2021-07-23

## 2021-09-06 RX ORDER — VALACYCLOVIR HYDROCHLORIDE 1 G/1
1000 TABLET, FILM COATED ORAL 3 TIMES DAILY
Qty: 21 TABLET | Refills: 0 | Status: SHIPPED | OUTPATIENT
Start: 2021-09-06 | End: 2021-09-13

## 2021-09-07 NOTE — PROGRESS NOTES
[facility]        NAME: Joy Del Toro is a 51 y o  female  : 1975    MRN: [id number]  DATE: 2021  TIME: 10:56 AM    Assessment and Plan   Herpes zoster without complication [E54 2]  1  Herpes zoster without complication  valACYclovir (VALTREX) 1,000 mg tablet         Patient Instructions       Takes Valtrex as directed    monitor for worsening symptoms  Follow up with PCP in 3-5 days  Proceed to  ER if symptoms worsen  Chief Complaint     Chief Complaint   Patient presents with    Rash     to right inner elbow, painful, has had it x 2 days, also to left inner wrist, pt questioning shingles         History of Present Illness        Patient presents with complaint of painful rash of right inner elbow for the past 2 days  She describes it as bumpy, blistery, and red  She describes the pain as burning  She denies any drainage, swelling, or surrounding erythema  Patient states that she has had shingles in the past and this feels similar  She does report recent increase in stress  Patient states that she has been applying a topical antibiotic ointment without any improvement  Review of Systems   Review of Systems   Constitutional: Negative for chills and fever  HENT: Negative for ear pain and sore throat  Eyes: Negative for pain and visual disturbance  Respiratory: Negative for cough and shortness of breath  Cardiovascular: Negative for chest pain and palpitations  Gastrointestinal: Negative for abdominal pain and vomiting  Genitourinary: Negative for dysuria and hematuria  Musculoskeletal: Negative for arthralgias and back pain  Skin: Positive for rash  Negative for color change  Neurological: Negative for seizures and syncope  All other systems reviewed and are negative          Current Medications       Current Outpatient Medications:     clindamycin (CLEOCIN T) 1 % external solution, apply topically to affected area twice a day UNTIL DIRECTED TO STOP, Disp: , Rfl:     fluticasone (Flonase) 50 mcg/act nasal spray, 1 spray in each nostril, Disp: , Rfl:     hydrochlorothiazide (HYDRODIURIL) 25 mg tablet, Take 25 mg by mouth daily, Disp: , Rfl:     levocetirizine (Xyzal Allergy 24HR) 5 MG tablet, , Disp: , Rfl:     metoprolol succinate (TOPROL-XL) 25 mg 24 hr tablet, Take 25 mg by mouth daily, Disp: , Rfl:     norethindrone (MICRONOR) 0 35 MG tablet, Take 1 tablet by mouth daily, Disp: , Rfl:     Cryselle-28 0 3-30 MG-MCG per tablet, Take 1 tablet by mouth daily (Patient not taking: Reported on 9/6/2021), Disp: , Rfl:     dicyclomine (BENTYL) 20 mg tablet, Take 20 mg by mouth 4 (four) times a day (Patient not taking: Reported on 9/6/2021), Disp: , Rfl:     Lactobacillus-Inulin ([facility]), Take by mouth (Patient not taking: Reported on 9/6/2021), Disp: , Rfl:     meloxicam (MOBIC) 15 mg tablet, Take 15 mg by mouth daily with breakfast (Patient not taking: Reported on 9/6/2021), Disp: , Rfl:     valACYclovir (VALTREX) 1,000 mg tablet, Take 1 tablet (1,000 mg total) by mouth 3 (three) times a day for 7 days, Disp: 21 tablet, Rfl: 0    Current Allergies     Allergies as of 09/06/2021    (No Known Allergies)            The following portions of the patient's history were reviewed and updated as appropriate: allergies, current medications, past family history, past medical history, past social history, past surgical history and problem list      Past Medical History:   Diagnosis Date    Allergies     seasonal    Arthritis     Hypertension        Past Surgical History:   Procedure Laterality Date    HIP ARTHROSCOPY W/ LABRAL DEBRIDEMENT Left 07/28/2017    on both hips       Family History   Problem Relation Age of Onset    No Known Problems Mother     No Known Problems Father     No Known Problems Sister     No Known Problems Daughter     No Known Problems Maternal Grandmother     No Known Problems Maternal Grandfather     No Known Problems Paternal Grandmother     No Known Problems Paternal Grandfather     No Known Problems Maternal Aunt     No Known Problems Maternal Aunt     Breast cancer Paternal Aunt         about 21 years ago    No Known Problems Paternal Aunt     Colon polyps Neg Hx     Colon cancer Neg Hx          Medications have been verified  Objective   /90   Pulse 75   Temp 97 6 °F (36 4 °C) (Tympanic)   Resp 20   Ht 5' 6" (1 676 m)   Wt 93 kg (205 lb)   SpO2 96%   BMI 33 09 kg/m²   No LMP recorded  Physical Exam     Physical Exam  Vitals and nursing note reviewed  Constitutional:       General: She is not in acute distress  Appearance: Normal appearance  She is well-developed  She is not ill-appearing or diaphoretic  HENT:      Head: Normocephalic and atraumatic  Eyes:      Conjunctiva/sclera: Conjunctivae normal       Pupils: Pupils are equal, round, and reactive to light  Cardiovascular:      Rate and Rhythm: Normal rate and regular rhythm  Heart sounds: Normal heart sounds  Pulmonary:      Effort: Pulmonary effort is normal  No respiratory distress  Breath sounds: Normal breath sounds  No stridor  Musculoskeletal:      Cervical back: Normal range of motion and neck supple  Lymphadenopathy:      Cervical: No cervical adenopathy  Skin:     General: Skin is warm and dry  Capillary Refill: Capillary refill takes less than 2 seconds  Findings: Rash (erythematous, rough, vesicular lesions along right inner elbow; no swelling, erythema, or induration; no drinage; TTP) present  Neurological:      Mental Status: She is alert and oriented to person, place, and time  Cranial Nerves: No cranial nerve deficit  Sensory: No sensory deficit  Psychiatric:         Behavior: Behavior normal          Thought Content:  Thought content normal

## 2021-12-01 ENCOUNTER — OFFICE VISIT (OUTPATIENT)
Dept: URGENT CARE | Facility: CLINIC | Age: 46
End: 2021-12-01
Payer: COMMERCIAL

## 2021-12-01 VITALS
BODY MASS INDEX: 32.14 KG/M2 | TEMPERATURE: 98.9 F | RESPIRATION RATE: 16 BRPM | DIASTOLIC BLOOD PRESSURE: 82 MMHG | HEART RATE: 78 BPM | WEIGHT: 200 LBS | OXYGEN SATURATION: 98 % | HEIGHT: 66 IN | SYSTOLIC BLOOD PRESSURE: 152 MMHG

## 2021-12-01 DIAGNOSIS — M77.11 LATERAL EPICONDYLITIS OF RIGHT ELBOW: Primary | ICD-10-CM

## 2021-12-01 PROCEDURE — 99213 OFFICE O/P EST LOW 20 MIN: CPT | Performed by: PHYSICIAN ASSISTANT

## 2021-12-01 RX ORDER — MELOXICAM 15 MG/1
15 TABLET ORAL DAILY
Qty: 30 TABLET | Refills: 0 | Status: SHIPPED | OUTPATIENT
Start: 2021-12-01 | End: 2021-12-10

## 2021-12-07 DIAGNOSIS — M77.11 LATERAL EPICONDYLITIS OF RIGHT ELBOW: ICD-10-CM

## 2021-12-10 RX ORDER — MELOXICAM 15 MG/1
TABLET ORAL
Qty: 30 TABLET | Refills: 0 | Status: SHIPPED | OUTPATIENT
Start: 2021-12-10